# Patient Record
Sex: MALE | Race: WHITE | NOT HISPANIC OR LATINO
[De-identification: names, ages, dates, MRNs, and addresses within clinical notes are randomized per-mention and may not be internally consistent; named-entity substitution may affect disease eponyms.]

---

## 2017-03-22 ENCOUNTER — APPOINTMENT (OUTPATIENT)
Dept: ORTHOPEDIC SURGERY | Facility: CLINIC | Age: 67
End: 2017-03-22

## 2017-03-22 VITALS
WEIGHT: 200 LBS | HEART RATE: 77 BPM | HEIGHT: 68 IN | DIASTOLIC BLOOD PRESSURE: 108 MMHG | BODY MASS INDEX: 30.31 KG/M2 | SYSTOLIC BLOOD PRESSURE: 173 MMHG

## 2017-03-22 DIAGNOSIS — M25.562 PAIN IN LEFT KNEE: ICD-10-CM

## 2017-04-03 ENCOUNTER — APPOINTMENT (OUTPATIENT)
Dept: ORTHOPEDIC SURGERY | Facility: CLINIC | Age: 67
End: 2017-04-03

## 2017-04-03 DIAGNOSIS — S83.282A OTHER TEAR OF LATERAL MENISCUS, CURRENT INJURY, LEFT KNEE, INITIAL ENCOUNTER: ICD-10-CM

## 2017-04-04 PROBLEM — S83.282A ACUTE LATERAL MENISCAL TEAR, LEFT, INITIAL ENCOUNTER: Status: ACTIVE | Noted: 2017-03-22

## 2017-06-06 ENCOUNTER — RX RENEWAL (OUTPATIENT)
Age: 67
End: 2017-06-06

## 2017-06-15 ENCOUNTER — APPOINTMENT (OUTPATIENT)
Dept: ORTHOPEDIC SURGERY | Facility: CLINIC | Age: 67
End: 2017-06-15

## 2017-06-15 DIAGNOSIS — S83.207D UNSPECIFIED TEAR OF UNSPECIFIED MENISCUS, CURRENT INJURY, LEFT KNEE, SUBSEQUENT ENCOUNTER: ICD-10-CM

## 2017-06-19 ENCOUNTER — APPOINTMENT (OUTPATIENT)
Dept: ORTHOPEDIC SURGERY | Facility: CLINIC | Age: 67
End: 2017-06-19

## 2017-06-27 PROBLEM — S83.207D ACUTE MENISCAL TEAR OF LEFT KNEE, SUBSEQUENT ENCOUNTER: Status: ACTIVE | Noted: 2017-03-22

## 2018-01-02 ENCOUNTER — APPOINTMENT (OUTPATIENT)
Dept: ORTHOPEDIC SURGERY | Facility: CLINIC | Age: 68
End: 2018-01-02
Payer: COMMERCIAL

## 2018-01-02 DIAGNOSIS — M65.342 TRIGGER FINGER, LEFT RING FINGER: ICD-10-CM

## 2018-01-02 PROCEDURE — 99214 OFFICE O/P EST MOD 30 MIN: CPT | Mod: 25

## 2018-01-02 PROCEDURE — 20550 NJX 1 TENDON SHEATH/LIGAMENT: CPT | Mod: F5

## 2018-04-02 ENCOUNTER — APPOINTMENT (OUTPATIENT)
Dept: ORTHOPEDIC SURGERY | Facility: CLINIC | Age: 68
End: 2018-04-02
Payer: COMMERCIAL

## 2018-04-02 DIAGNOSIS — M65.311 TRIGGER THUMB, RIGHT THUMB: ICD-10-CM

## 2018-04-02 PROCEDURE — 20550 NJX 1 TENDON SHEATH/LIGAMENT: CPT | Mod: RT

## 2018-04-02 PROCEDURE — 99214 OFFICE O/P EST MOD 30 MIN: CPT | Mod: 25

## 2019-05-22 ENCOUNTER — APPOINTMENT (OUTPATIENT)
Dept: ORTHOPEDIC SURGERY | Facility: CLINIC | Age: 69
End: 2019-05-22
Payer: OTHER MISCELLANEOUS

## 2019-05-22 PROCEDURE — 99214 OFFICE O/P EST MOD 30 MIN: CPT | Mod: 25

## 2019-05-22 PROCEDURE — 20526 THER INJECTION CARP TUNNEL: CPT | Mod: LT

## 2019-07-29 ENCOUNTER — APPOINTMENT (OUTPATIENT)
Dept: ORTHOPEDIC SURGERY | Facility: CLINIC | Age: 69
End: 2019-07-29
Payer: OTHER MISCELLANEOUS

## 2019-07-29 VITALS
DIASTOLIC BLOOD PRESSURE: 79 MMHG | WEIGHT: 200 LBS | BODY MASS INDEX: 30.31 KG/M2 | HEIGHT: 68 IN | HEART RATE: 69 BPM | SYSTOLIC BLOOD PRESSURE: 129 MMHG

## 2019-07-29 PROCEDURE — 99213 OFFICE O/P EST LOW 20 MIN: CPT

## 2020-03-17 ENCOUNTER — APPOINTMENT (OUTPATIENT)
Dept: ORTHOPEDIC SURGERY | Facility: CLINIC | Age: 70
End: 2020-03-17
Payer: OTHER MISCELLANEOUS

## 2020-03-17 VITALS — BODY MASS INDEX: 31.07 KG/M2 | WEIGHT: 205 LBS | HEIGHT: 68 IN | HEART RATE: 66 BPM

## 2020-03-17 PROCEDURE — 99214 OFFICE O/P EST MOD 30 MIN: CPT | Mod: 25

## 2020-03-17 PROCEDURE — 20526 THER INJECTION CARP TUNNEL: CPT | Mod: LT

## 2020-08-24 ENCOUNTER — APPOINTMENT (OUTPATIENT)
Dept: ORTHOPEDIC SURGERY | Facility: CLINIC | Age: 70
End: 2020-08-24
Payer: OTHER MISCELLANEOUS

## 2020-08-24 VITALS
DIASTOLIC BLOOD PRESSURE: 78 MMHG | HEIGHT: 68 IN | TEMPERATURE: 97.4 F | HEART RATE: 78 BPM | WEIGHT: 204 LBS | SYSTOLIC BLOOD PRESSURE: 134 MMHG | BODY MASS INDEX: 30.92 KG/M2

## 2020-08-24 DIAGNOSIS — M47.812 SPONDYLOSIS W/OUT MYELOPATHY OR RADICULOPATHY, CERVICAL REGION: ICD-10-CM

## 2020-08-24 PROCEDURE — 99203 OFFICE O/P NEW LOW 30 MIN: CPT

## 2020-08-24 PROCEDURE — 72040 X-RAY EXAM NECK SPINE 2-3 VW: CPT

## 2020-08-25 PROBLEM — M47.812 CERVICAL SPONDYLOSIS: Status: ACTIVE | Noted: 2020-08-25

## 2020-08-25 NOTE — HISTORY OF PRESENT ILLNESS
[de-identified] : Patient is a 70-year-old male who had a work-related injury approximately 11 days ago.  He was hit in the back of the neck on the left side with some piping.  He denies any radicular complaints.  Symptoms are improving with anti-inflammatories.  No numbness, tingling, weakness, or bowel or bladder dysfunction. No difficulty with balance or fine motor skills. No fevers or chills. No constitutional symptoms or recent unintended weight loss.

## 2020-08-25 NOTE — DISCUSSION/SUMMARY
[de-identified] : He will try a course of physical therapy.  MRI if he fails to continue to improve or worsens.

## 2020-08-25 NOTE — PHYSICAL EXAM
[Ataxic] : not ataxic [de-identified] : AP lateral cervical x-rays does not reveal any obvious fracture or dislocation.  Multilevel spondylosis [de-identified] : Examination of the cervical spine reveals no midline or paraspinal tenderness to palpation. No cervical lymphadenopathy. Decreased range of motion with respect to flexion, extension, rotation, and lateral bending. Negative Spurlings. Negative Lhermitte's. Full range of motion bilateral shoulders without evidence of impingement. No instability of bilateral upper extremities.  Cranial nerves II through XII grossly intact. Intact sensation bilateral upper extremities. 5/5 deltoids biceps triceps wrist extensors wrist flexors finger flexors and hand intrinsics. 1+ biceps triceps and brachioradialis reflexes. Negative Hobbs's. 2+ radial pulse. Negative Tinel's over the cubital and carpal tunnel. No skin lesions on the right and left upper extremities.

## 2020-09-20 ENCOUNTER — TRANSCRIPTION ENCOUNTER (OUTPATIENT)
Age: 70
End: 2020-09-20

## 2020-12-10 ENCOUNTER — APPOINTMENT (OUTPATIENT)
Dept: ORTHOPEDIC SURGERY | Facility: CLINIC | Age: 70
End: 2020-12-10
Payer: OTHER MISCELLANEOUS

## 2020-12-10 PROCEDURE — 99214 OFFICE O/P EST MOD 30 MIN: CPT

## 2020-12-10 PROCEDURE — 99072 ADDL SUPL MATRL&STAF TM PHE: CPT

## 2020-12-15 ENCOUNTER — NON-APPOINTMENT (OUTPATIENT)
Age: 70
End: 2020-12-15

## 2021-01-22 ENCOUNTER — NON-APPOINTMENT (OUTPATIENT)
Age: 71
End: 2021-01-22

## 2021-01-28 ENCOUNTER — FORM ENCOUNTER (OUTPATIENT)
Age: 71
End: 2021-01-28

## 2021-01-28 ENCOUNTER — APPOINTMENT (OUTPATIENT)
Dept: ORTHOPEDIC SURGERY | Facility: CLINIC | Age: 71
End: 2021-01-28
Payer: OTHER MISCELLANEOUS

## 2021-01-28 VITALS
WEIGHT: 202 LBS | HEIGHT: 68 IN | DIASTOLIC BLOOD PRESSURE: 96 MMHG | HEART RATE: 81 BPM | SYSTOLIC BLOOD PRESSURE: 172 MMHG | BODY MASS INDEX: 30.62 KG/M2

## 2021-01-28 VITALS — TEMPERATURE: 97.6 F

## 2021-01-28 PROCEDURE — 99214 OFFICE O/P EST MOD 30 MIN: CPT

## 2021-01-28 PROCEDURE — 99072 ADDL SUPL MATRL&STAF TM PHE: CPT

## 2021-01-28 NOTE — HISTORY OF PRESENT ILLNESS
[de-identified] : Patient returns for follow-up today.  He continues to have symptoms.  He is having more symptoms radiating down his left arm.  He was diagnosed with carpal tunnel.  A recent EMG also indicated he may have some cervical radiculopathy.

## 2021-01-28 NOTE — PHYSICAL EXAM
[Ataxic] : not ataxic [de-identified] : Examination of the cervical spine reveals no midline or paraspinal tenderness to palpation. No cervical lymphadenopathy. Decreased range of motion with respect to flexion, extension, rotation, and lateral bending.  Positive Spurlings. Negative Lhermitte's. Full range of motion bilateral shoulders without evidence of impingement. No instability of bilateral upper extremities.  Cranial nerves II through XII grossly intact. Intact sensation bilateral upper extremities. 5/5 deltoids biceps triceps wrist extensors wrist flexors finger flexors and hand intrinsics. 1+ biceps triceps and brachioradialis reflexes. Negative Hobbs's. 2+ radial pulse.  Positive Tinel's over the left carpal tunnel. No skin lesions on the right and left upper extremities. [de-identified] : AP lateral cervical x-rays does not reveal any obvious fracture or dislocation.  Multilevel spondylosis

## 2021-01-28 NOTE — DISCUSSION/SUMMARY
[de-identified] : Given his radiculopathy it does appear he has some double crush component to his symptoms.  The cervical spine MRI will be obtained.  Follow-up afterwards.

## 2021-02-03 ENCOUNTER — FORM ENCOUNTER (OUTPATIENT)
Age: 71
End: 2021-02-03

## 2021-02-10 ENCOUNTER — APPOINTMENT (OUTPATIENT)
Dept: ORTHOPEDIC SURGERY | Facility: CLINIC | Age: 71
End: 2021-02-10
Payer: OTHER MISCELLANEOUS

## 2021-02-10 VITALS
HEIGHT: 68 IN | DIASTOLIC BLOOD PRESSURE: 94 MMHG | BODY MASS INDEX: 30.62 KG/M2 | WEIGHT: 202 LBS | SYSTOLIC BLOOD PRESSURE: 151 MMHG

## 2021-02-10 VITALS — TEMPERATURE: 97.8 F

## 2021-02-10 DIAGNOSIS — Z78.9 OTHER SPECIFIED HEALTH STATUS: ICD-10-CM

## 2021-02-10 PROCEDURE — 99072 ADDL SUPL MATRL&STAF TM PHE: CPT

## 2021-02-10 PROCEDURE — 99214 OFFICE O/P EST MOD 30 MIN: CPT

## 2021-02-12 PROBLEM — Z78.9 CURRENT NON-SMOKER: Status: ACTIVE | Noted: 2021-02-12

## 2021-03-08 ENCOUNTER — APPOINTMENT (OUTPATIENT)
Dept: ORTHOPEDIC SURGERY | Facility: CLINIC | Age: 71
End: 2021-03-08
Payer: OTHER MISCELLANEOUS

## 2021-03-08 PROCEDURE — 99072 ADDL SUPL MATRL&STAF TM PHE: CPT

## 2021-03-08 PROCEDURE — 99214 OFFICE O/P EST MOD 30 MIN: CPT

## 2021-03-08 NOTE — DISCUSSION/SUMMARY
[de-identified] : We discussed further treatment options.  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  We discussed treatment of her cervical spine.  He would like to undergo some physical therapy.  He is not interested in injections or surgery at this point.  He will undergo a course of therapy and follow-up after sooner with any changes or worsening of his symptoms.

## 2021-03-08 NOTE — PHYSICAL EXAM
[Ataxic] : not ataxic [de-identified] : Examination of the cervical spine reveals no midline or paraspinal tenderness to palpation. No cervical lymphadenopathy. Decreased range of motion with respect to flexion, extension, rotation, and lateral bending.  Positive Spurlings. Negative Lhermitte's. Full range of motion bilateral shoulders without evidence of impingement. No instability of bilateral upper extremities.  Cranial nerves II through XII grossly intact. Intact sensation bilateral upper extremities. 5/5 deltoids biceps triceps wrist extensors wrist flexors finger flexors and hand intrinsics. 1+ biceps triceps and brachioradialis reflexes. Negative Hobbs's. 2+ radial pulse.  Positive Tinel's over the left carpal tunnel. No skin lesions on the right and left upper extremities. [de-identified] : AP lateral cervical x-rays does not reveal any obvious fracture or dislocation.  Multilevel spondylosis\par \par Cervical MRI does demonstrate left-sided foraminal stenosis most pronounced at C6-7\par \par EMG does reveal evidence of cervical radiculopathy and carpal tunnel syndrome

## 2021-03-08 NOTE — HISTORY OF PRESENT ILLNESS
[de-identified] : Mr. Woodson returns to the office for a follow-up visit today.  He is here to review his cervical MRI.  He continues to have neck and left shoulder pain.  His left hand numbness is more consistent.

## 2021-04-30 ENCOUNTER — OUTPATIENT (OUTPATIENT)
Dept: OUTPATIENT SERVICES | Facility: HOSPITAL | Age: 71
LOS: 1 days | End: 2021-04-30
Payer: COMMERCIAL

## 2021-04-30 VITALS
HEIGHT: 68 IN | OXYGEN SATURATION: 98 % | HEART RATE: 70 BPM | SYSTOLIC BLOOD PRESSURE: 152 MMHG | WEIGHT: 205.91 LBS | TEMPERATURE: 98 F | RESPIRATION RATE: 18 BRPM | DIASTOLIC BLOOD PRESSURE: 80 MMHG

## 2021-04-30 DIAGNOSIS — Z01.818 ENCOUNTER FOR OTHER PREPROCEDURAL EXAMINATION: ICD-10-CM

## 2021-04-30 DIAGNOSIS — Z90.89 ACQUIRED ABSENCE OF OTHER ORGANS: Chronic | ICD-10-CM

## 2021-04-30 DIAGNOSIS — G56.02 CARPAL TUNNEL SYNDROME, LEFT UPPER LIMB: ICD-10-CM

## 2021-04-30 DIAGNOSIS — Z98.890 OTHER SPECIFIED POSTPROCEDURAL STATES: Chronic | ICD-10-CM

## 2021-04-30 LAB
HCT VFR BLD CALC: 43.6 % — SIGNIFICANT CHANGE UP (ref 39–50)
HGB BLD-MCNC: 14.9 G/DL — SIGNIFICANT CHANGE UP (ref 13–17)
MCHC RBC-ENTMCNC: 31.1 PG — SIGNIFICANT CHANGE UP (ref 27–34)
MCHC RBC-ENTMCNC: 34.2 GM/DL — SIGNIFICANT CHANGE UP (ref 32–36)
MCV RBC AUTO: 91 FL — SIGNIFICANT CHANGE UP (ref 80–100)
NRBC # BLD: 0 /100 WBCS — SIGNIFICANT CHANGE UP (ref 0–0)
PLATELET # BLD AUTO: 242 K/UL — SIGNIFICANT CHANGE UP (ref 150–400)
RBC # BLD: 4.79 M/UL — SIGNIFICANT CHANGE UP (ref 4.2–5.8)
RBC # FLD: 12.7 % — SIGNIFICANT CHANGE UP (ref 10.3–14.5)
WBC # BLD: 7.2 K/UL — SIGNIFICANT CHANGE UP (ref 3.8–10.5)
WBC # FLD AUTO: 7.2 K/UL — SIGNIFICANT CHANGE UP (ref 3.8–10.5)

## 2021-04-30 PROCEDURE — 85027 COMPLETE CBC AUTOMATED: CPT

## 2021-04-30 PROCEDURE — G0463: CPT

## 2021-04-30 RX ORDER — SODIUM CHLORIDE 9 MG/ML
3 INJECTION INTRAMUSCULAR; INTRAVENOUS; SUBCUTANEOUS EVERY 8 HOURS
Refills: 0 | Status: DISCONTINUED | OUTPATIENT
Start: 2021-05-14 | End: 2021-05-28

## 2021-04-30 RX ORDER — LIDOCAINE HCL 20 MG/ML
0.2 VIAL (ML) INJECTION ONCE
Refills: 0 | Status: DISCONTINUED | OUTPATIENT
Start: 2021-05-14 | End: 2021-05-28

## 2021-04-30 NOTE — H&P PST ADULT - NSICDXPROBLEM_GEN_ALL_CORE_FT
PROBLEM DIAGNOSES  Problem: Carpal tunnel syndrome, left  Assessment and Plan: left wrist carpal tunnel release

## 2021-04-30 NOTE — H&P PST ADULT - HISTORY OF PRESENT ILLNESS
This is a 72 y/o male c/o left wrist pain/numbness, EMG revealed + carpal tunnel syndrome, he presents today for left wrist carpal tunnel release 5/14/21  [facility] pt denies fever, cough, SOB, change in taste/smell

## 2021-04-30 NOTE — H&P PST ADULT - NSANTHOSAYNRD_GEN_A_CORE
No. ERASMO screening performed.  STOP BANG Legend: 0-2 = LOW Risk; 3-4 = INTERMEDIATE Risk; 5-8 = HIGH Risk

## 2021-04-30 NOTE — H&P PST ADULT - NSICDXPASTMEDICALHX_GEN_ALL_CORE_FT
PAST MEDICAL HISTORY:  Neck pain, bilateral have physical therapy  to relieve neck pain    Seasonal allergies

## 2021-05-10 PROBLEM — M54.2 CERVICALGIA: Chronic | Status: ACTIVE | Noted: 2021-04-30

## 2021-05-10 PROBLEM — J30.2 OTHER SEASONAL ALLERGIC RHINITIS: Chronic | Status: ACTIVE | Noted: 2021-04-30

## 2021-05-11 ENCOUNTER — OUTPATIENT (OUTPATIENT)
Dept: OUTPATIENT SERVICES | Facility: HOSPITAL | Age: 71
LOS: 1 days | End: 2021-05-11
Payer: COMMERCIAL

## 2021-05-11 DIAGNOSIS — Z11.52 ENCOUNTER FOR SCREENING FOR COVID-19: ICD-10-CM

## 2021-05-11 DIAGNOSIS — Z90.89 ACQUIRED ABSENCE OF OTHER ORGANS: Chronic | ICD-10-CM

## 2021-05-11 DIAGNOSIS — Z98.890 OTHER SPECIFIED POSTPROCEDURAL STATES: Chronic | ICD-10-CM

## 2021-05-11 LAB — SARS-COV-2 RNA SPEC QL NAA+PROBE: SIGNIFICANT CHANGE UP

## 2021-05-11 PROCEDURE — C9803: CPT

## 2021-05-11 PROCEDURE — U0003: CPT

## 2021-05-11 PROCEDURE — U0005: CPT

## 2021-05-12 ENCOUNTER — NON-APPOINTMENT (OUTPATIENT)
Age: 71
End: 2021-05-12

## 2021-05-13 ENCOUNTER — TRANSCRIPTION ENCOUNTER (OUTPATIENT)
Age: 71
End: 2021-05-13

## 2021-05-14 ENCOUNTER — APPOINTMENT (OUTPATIENT)
Dept: ORTHOPEDIC SURGERY | Facility: HOSPITAL | Age: 71
End: 2021-05-14

## 2021-05-14 ENCOUNTER — OUTPATIENT (OUTPATIENT)
Dept: OUTPATIENT SERVICES | Facility: HOSPITAL | Age: 71
LOS: 1 days | End: 2021-05-14
Payer: COMMERCIAL

## 2021-05-14 VITALS
DIASTOLIC BLOOD PRESSURE: 81 MMHG | OXYGEN SATURATION: 100 % | TEMPERATURE: 98 F | RESPIRATION RATE: 14 BRPM | SYSTOLIC BLOOD PRESSURE: 140 MMHG | HEART RATE: 57 BPM

## 2021-05-14 VITALS
OXYGEN SATURATION: 99 % | TEMPERATURE: 98 F | WEIGHT: 205.91 LBS | DIASTOLIC BLOOD PRESSURE: 73 MMHG | HEIGHT: 68 IN | HEART RATE: 57 BPM | SYSTOLIC BLOOD PRESSURE: 149 MMHG | RESPIRATION RATE: 16 BRPM

## 2021-05-14 DIAGNOSIS — Z90.89 ACQUIRED ABSENCE OF OTHER ORGANS: Chronic | ICD-10-CM

## 2021-05-14 DIAGNOSIS — Z01.818 ENCOUNTER FOR OTHER PREPROCEDURAL EXAMINATION: ICD-10-CM

## 2021-05-14 DIAGNOSIS — Z98.890 OTHER SPECIFIED POSTPROCEDURAL STATES: Chronic | ICD-10-CM

## 2021-05-14 DIAGNOSIS — G56.02 CARPAL TUNNEL SYNDROME, LEFT UPPER LIMB: ICD-10-CM

## 2021-05-14 PROCEDURE — 64721 CARPAL TUNNEL SURGERY: CPT | Mod: LT

## 2021-05-14 RX ORDER — SODIUM CHLORIDE 9 MG/ML
1000 INJECTION, SOLUTION INTRAVENOUS
Refills: 0 | Status: DISCONTINUED | OUTPATIENT
Start: 2021-05-14 | End: 2021-05-28

## 2021-05-14 RX ORDER — CHLORHEXIDINE GLUCONATE 213 G/1000ML
1 SOLUTION TOPICAL ONCE
Refills: 0 | Status: COMPLETED | OUTPATIENT
Start: 2021-05-14 | End: 2021-05-14

## 2021-05-14 RX ADMIN — CHLORHEXIDINE GLUCONATE 1 APPLICATION(S): 213 SOLUTION TOPICAL at 05:34

## 2021-05-14 NOTE — ASU DISCHARGE PLAN (ADULT/PEDIATRIC) - ASU DC SPECIAL INSTRUCTIONSFT
keep dressing clean and dry  elevation, finger range of motion encouraged  no heavy lifting  koban bandage supplied for dressing change  follow up with Dr. Mix in 10 days, call for appointment

## 2021-05-19 ENCOUNTER — APPOINTMENT (OUTPATIENT)
Dept: ORTHOPEDIC SURGERY | Facility: CLINIC | Age: 71
End: 2021-05-19
Payer: OTHER MISCELLANEOUS

## 2021-05-19 PROCEDURE — 99214 OFFICE O/P EST MOD 30 MIN: CPT

## 2021-05-19 PROCEDURE — 99072 ADDL SUPL MATRL&STAF TM PHE: CPT

## 2021-05-19 NOTE — DISCUSSION/SUMMARY
[de-identified] : We again discussed further treatment options both nonsurgical and surgical.  He is recovering from carpal tunnel surgery.  He still has some cervical radiculopathy.  We discussed therapy injections and surgery.  At this time he wishes to continue with nonsurgical management.  He will follow up with me in 6 weeks time or sooner with any changes or worsening of his symptoms.

## 2021-05-19 NOTE — PHYSICAL EXAM
[Ataxic] : not ataxic [de-identified] : Examination of the cervical spine reveals no midline or paraspinal tenderness to palpation. No cervical lymphadenopathy. Decreased range of motion with respect to flexion, extension, rotation, and lateral bending.  Positive Spurlings. Negative Lhermitte's. Full range of motion bilateral shoulders without evidence of impingement. No instability of bilateral upper extremities.  Cranial nerves II through XII grossly intact. Intact sensation bilateral upper extremities. 5/5 deltoids biceps triceps wrist extensors wrist flexors finger flexors and hand intrinsics.  Examination of his left hand today is limited by his postsurgical bandage 1+ biceps triceps and brachioradialis reflexes. Negative Hobbs's. 2+ radial pulse. . No skin lesions on the right and left upper extremities. [de-identified] : AP lateral cervical x-rays does not reveal any obvious fracture or dislocation.  Multilevel spondylosis\par \par Cervical MRI does demonstrate left-sided foraminal stenosis most pronounced at C6-7\par \par EMG does reveal evidence of cervical radiculopathy and carpal tunnel syndrome

## 2021-05-19 NOTE — HISTORY OF PRESENT ILLNESS
[de-identified] : Mr. Woodson returns to the office for a follow-up visit today. He only had 4 PT sessions since last visit and feels that pain has gotten worse since last visit  He continues to have neck and left shoulder pain. He had CST sx this past Friday by Dr. Mix.   His left hand numbness is more consistent.

## 2021-05-21 ENCOUNTER — APPOINTMENT (OUTPATIENT)
Dept: ORTHOPEDIC SURGERY | Facility: CLINIC | Age: 71
End: 2021-05-21
Payer: OTHER MISCELLANEOUS

## 2021-05-21 PROCEDURE — 99024 POSTOP FOLLOW-UP VISIT: CPT

## 2021-05-21 RX ORDER — TOBRAMYCIN AND DEXAMETHASONE 3; 1 MG/ML; MG/ML
0.3-0.1 SUSPENSION/ DROPS OPHTHALMIC
Qty: 5 | Refills: 0 | Status: DISCONTINUED | COMMUNITY
Start: 2021-02-02 | End: 2021-05-21

## 2021-05-21 NOTE — HISTORY OF PRESENT ILLNESS
[de-identified] : Left carpal tunnel release 7 days ago. [de-identified] : The patient underwent left carpal tunnel release May 14, 2021.  Patient returns for follow-up.\par Patient reports that the pain and numbness that was worse at night is much better.  "The whole hand is not getting numb now."  Patient has ongoing numbness primarily in index and long fingers. [de-identified] : Left hand\par Carpal tunnel incision is healing well.  2 of 5 sutures removed.  3 sutures left in place.\par Full active flexion extension of fingers\par Minimal swelling around the incision and minimal ecchymosis.\par Sensation normal in ring and little fingers.\par Sensation much improved in thumb but not normal.\par Ongoing numbness in index and long fingers.\par No additional pertinent positive contributory findings. [de-identified] : 1. Patient has considerable improvement after left carpal tunnel release.  Patient still has ongoing numbness in the index and long fingers in particular and slightly in the thumb.  The night time exacerbations have improved considerably almost gone.  The ring and little fingers have normal sensation.  Partial sutures removed today.\par 2.  The patient has ongoing symptoms on the right.  Symptoms are due to both carpal tunnel syndrome and cervical radiculopathy.  The patient is under the care of Isacc Trotter MD for radiculopathy. [de-identified] : 1.  Patient should return approximately June 1, 2021, for removal of remaining sutures.\par 2.  Wound care including washing with Hibiclens, cleansing with alcohol, and applying triple antibiotic if redness in the skin appears all discussed.  Application and removal of dressings explained and discussed.\par 3.  Patient must be cautious with the left hand to allow for wound healing and to avoid dehiscence.  No use with the left hand over 2 to 3 pounds.\par 4.  Patient acknowledges above agrees to be cautious with use of left hand.\par 5.  Patient to follow-up with Dr. Trotter for radiculopathy.\par 6.  Patient may require right carpal tunnel release, and this was discussed today and will be discussed at the next visit.\par  A lengthy and detailed discussion was held with the patient regarding analysis, treatment, and recommendations. All questions have been answered. At the conclusion the patient expressed acceptance and understanding.

## 2021-06-01 ENCOUNTER — APPOINTMENT (OUTPATIENT)
Dept: ORTHOPEDIC SURGERY | Facility: CLINIC | Age: 71
End: 2021-06-01
Payer: OTHER MISCELLANEOUS

## 2021-06-01 PROCEDURE — 99024 POSTOP FOLLOW-UP VISIT: CPT

## 2021-06-01 RX ORDER — MOXIFLOXACIN OPHTHALMIC 5 MG/ML
0.5 SOLUTION/ DROPS OPHTHALMIC
Qty: 3 | Refills: 0 | Status: ACTIVE | COMMUNITY
Start: 2021-04-26

## 2021-06-28 ENCOUNTER — APPOINTMENT (OUTPATIENT)
Dept: ORTHOPEDIC SURGERY | Facility: CLINIC | Age: 71
End: 2021-06-28
Payer: OTHER MISCELLANEOUS

## 2021-06-28 PROCEDURE — 99072 ADDL SUPL MATRL&STAF TM PHE: CPT

## 2021-06-28 PROCEDURE — 99214 OFFICE O/P EST MOD 30 MIN: CPT

## 2021-06-29 ENCOUNTER — FORM ENCOUNTER (OUTPATIENT)
Age: 71
End: 2021-06-29

## 2021-06-29 NOTE — DISCUSSION/SUMMARY
[de-identified] : We discussed further treatment options.  He would like to try some PT.  F/u after

## 2021-06-29 NOTE — HISTORY OF PRESENT ILLNESS
[de-identified] : Mr. Woodson returns to the office for a follow-up visit today. He only had 4 PT sessions since last visit and feels that pain has gotten worse since last visit  He continues to have neck and left shoulder pain. He had CST sx this past Friday by Dr. Mix.   His left hand numbness is more consistent.

## 2021-06-29 NOTE — PHYSICAL EXAM
[Ataxic] : not ataxic [de-identified] : Examination of the cervical spine reveals no midline or paraspinal tenderness to palpation. No cervical lymphadenopathy. Decreased range of motion with respect to flexion, extension, rotation, and lateral bending.  Positive Spurlings. Negative Lhermitte's. Full range of motion bilateral shoulders without evidence of impingement. No instability of bilateral upper extremities.  Cranial nerves II through XII grossly intact. Intact sensation bilateral upper extremities. 5/5 deltoids biceps triceps wrist extensors wrist flexors finger flexors and hand intrinsics.  1+ biceps triceps and brachioradialis reflexes. Negative Hobbs's. 2+ radial pulse. . No skin lesions on the right and left upper extremities. [de-identified] : AP lateral cervical x-rays does not reveal any obvious fracture or dislocation.  Multilevel spondylosis\par \par Cervical MRI does demonstrate left-sided foraminal stenosis most pronounced at C6-7\par \par EMG does reveal evidence of cervical radiculopathy and carpal tunnel syndrome

## 2021-07-05 ENCOUNTER — FORM ENCOUNTER (OUTPATIENT)
Age: 71
End: 2021-07-05

## 2021-07-13 ENCOUNTER — APPOINTMENT (OUTPATIENT)
Dept: ORTHOPEDIC SURGERY | Facility: CLINIC | Age: 71
End: 2021-07-13
Payer: COMMERCIAL

## 2021-07-13 PROCEDURE — 99213 OFFICE O/P EST LOW 20 MIN: CPT | Mod: 24

## 2021-07-13 PROCEDURE — 99072 ADDL SUPL MATRL&STAF TM PHE: CPT

## 2021-08-23 ENCOUNTER — APPOINTMENT (OUTPATIENT)
Dept: ORTHOPEDIC SURGERY | Facility: CLINIC | Age: 71
End: 2021-08-23
Payer: OTHER MISCELLANEOUS

## 2021-08-23 VITALS — BODY MASS INDEX: 30.31 KG/M2 | HEIGHT: 68 IN | WEIGHT: 200 LBS

## 2021-08-23 PROCEDURE — 20550 NJX 1 TENDON SHEATH/LIGAMENT: CPT | Mod: FA

## 2021-08-23 PROCEDURE — 99214 OFFICE O/P EST MOD 30 MIN: CPT | Mod: 25

## 2021-08-23 PROCEDURE — 99072 ADDL SUPL MATRL&STAF TM PHE: CPT

## 2021-08-30 ENCOUNTER — APPOINTMENT (OUTPATIENT)
Dept: ORTHOPEDIC SURGERY | Facility: CLINIC | Age: 71
End: 2021-08-30
Payer: OTHER MISCELLANEOUS

## 2021-08-30 PROCEDURE — 99072 ADDL SUPL MATRL&STAF TM PHE: CPT

## 2021-08-30 PROCEDURE — 99214 OFFICE O/P EST MOD 30 MIN: CPT

## 2021-08-30 NOTE — PHYSICAL EXAM
[Ataxic] : not ataxic [de-identified] : Examination of the cervical spine reveals no midline or paraspinal tenderness to palpation. No cervical lymphadenopathy. Decreased range of motion with respect to flexion, extension, rotation, and lateral bending.  Positive Spurlings. Negative Lhermitte's. Full range of motion bilateral shoulders without evidence of impingement. No instability of bilateral upper extremities.  Cranial nerves II through XII grossly intact. Intact sensation bilateral upper extremities. 5/5 deltoids biceps triceps wrist extensors wrist flexors finger flexors and hand intrinsics.  1+ biceps triceps and brachioradialis reflexes. Negative Hobbs's. 2+ radial pulse. . No skin lesions on the right and left upper extremities. [de-identified] : AP lateral cervical x-rays does not reveal any obvious fracture or dislocation.  Multilevel spondylosis\par \par Cervical MRI does demonstrate left-sided foraminal stenosis most pronounced at C6-7\par \par EMG does reveal evidence of cervical radiculopathy and carpal tunnel syndrome

## 2021-08-30 NOTE — HISTORY OF PRESENT ILLNESS
[de-identified] : Mr. Woodson returns to the office for a follow-up visit today. He has been doing physical therapy.  He feels it has helped his neck pain and range of motion by 20 %.  His left arm feels the same.

## 2021-08-30 NOTE — DISCUSSION/SUMMARY
[de-identified] : We discussed further treatment options both nonsurgical and surgical.  This point he wishes to continue with nonsurgical treatment.  He will continue with therapy.  Follow-up afterwards.

## 2021-09-08 ENCOUNTER — FORM ENCOUNTER (OUTPATIENT)
Age: 71
End: 2021-09-08

## 2021-09-13 ENCOUNTER — FORM ENCOUNTER (OUTPATIENT)
Age: 71
End: 2021-09-13

## 2021-09-20 ENCOUNTER — APPOINTMENT (OUTPATIENT)
Dept: ORTHOPEDIC SURGERY | Facility: CLINIC | Age: 71
End: 2021-09-20

## 2021-11-04 ENCOUNTER — APPOINTMENT (OUTPATIENT)
Dept: ORTHOPEDIC SURGERY | Facility: CLINIC | Age: 71
End: 2021-11-04

## 2021-11-04 NOTE — HISTORY OF PRESENT ILLNESS
[FreeTextEntry1] : 70y/o RHD male presents for follow up of B/L CTS. \par \par 1) Left: Patient had Left carpal tunnel release May 14, 2021. The patient reports some improvement of numbness and tingling after surgery. However, he reports he is still experiencing some numbness and tingling in the left hand, and intermittent pain that radiates from his neck down the left arm into the left hand. \par \par 2) Right carpal tunnel syndrome. \par Patient reports ongoing intermittent numbness, tingling, and pain in the Right hand for several years.\par He reports the symptoms are worse when holding a phone, driving a car, or when holding an object for a long period of time. Not usually at night. Right carpal tunnel release was previously discussed. He states he would like to schedule surgery after his left hand improves. \par \par The patient had EMG 01/06/21. EMG shows bilateral carpal tunnel syndrome primarily. There are also findings of cervical radiculopathy, bilateral C 5-6 radiculopathy. there are some minor findings of ulnar and radial nerve changes but the patient does not have corresponding subjective symptoms.\par \par Hx: s/p Left carpal tunnel release May 14, 2021. \par Pt has ongoing neck and upper back pain radiating to upper back and towards left shoulder and prox upper arm. Patient has been doing physical therapy for cervical spine and left arm. [FreeTextEntry2] :

## 2021-11-17 ENCOUNTER — APPOINTMENT (OUTPATIENT)
Dept: ORTHOPEDIC SURGERY | Facility: CLINIC | Age: 71
End: 2021-11-17
Payer: OTHER MISCELLANEOUS

## 2021-11-17 VITALS
SYSTOLIC BLOOD PRESSURE: 146 MMHG | DIASTOLIC BLOOD PRESSURE: 81 MMHG | HEIGHT: 68 IN | WEIGHT: 200 LBS | BODY MASS INDEX: 30.31 KG/M2 | HEART RATE: 65 BPM

## 2021-11-17 DIAGNOSIS — M65.332 TRIGGER FINGER, LEFT MIDDLE FINGER: ICD-10-CM

## 2021-11-17 PROCEDURE — 20550 NJX 1 TENDON SHEATH/LIGAMENT: CPT | Mod: FA

## 2021-11-17 PROCEDURE — 99072 ADDL SUPL MATRL&STAF TM PHE: CPT

## 2021-11-17 PROCEDURE — 99215 OFFICE O/P EST HI 40 MIN: CPT | Mod: 25

## 2021-11-17 NOTE — ASSESSMENT
[FreeTextEntry1] : Patient is [employer] .  The patient has worked with his hands for many years as an .  As a result the patient has developed bilateral carpal tunnel syndrome.  Left carpal tunnel release was performed with moderate improvement only. \par \par Currently, the patient has painful and continuous numbness and tingling in the right with frequent exacerbations.  This is due to longstanding right carpal tunnel syndrome work related.  Because the patient has patient has markedly symptomatic carpal tunnel syndrome on the right with positive electrodiagnostic testing, and because the patient is refractory to conservative treatment for carpal tunnel syndrome, right carpal tunnel release (78163) is medically indicated necessary.\par Approval and authorization for right carpal tunnel release are requested requested.\par Approval and authorization for hand therapy postoperatively if necessary are also requested requested.

## 2021-11-17 NOTE — PHYSICAL EXAM
[de-identified] : Right-hand:\par Thumb A1 pulley slightly tender.\par Nodularity associated with FPL.\par No pain, no provocable or active triggering\par There is no other A1 pulley tenderness or triggering in any finger, right hand.\par No pertinent MP, PIP, or DIP joint contributory findings, except some Heberden's nodes; none are clinically painful.\par \par Left hand:\par severe Tenderness left thumb A1 pulley; restricted movement b/o pain and guarding\par Following cortisone injection patient was able to flex and extend the thumb and triggering was evident.\par Left long finger A1 pulley has tenderness with provocable triggering.  Pain is mild.\par No  other A1 pulley tenderness and no triggering in any finger.\par No pertinent MP, PIP, or DIP joint contributory findings, except some Heberden's nodes; none are clinically painful.\par \par Neurologic:\par Left hand: numbness/tingling at rest thumb,  index, long, ring RDN. Normal other fingers.\par Slightly altered subjective sensation on the dorsum of the left hand radially.\par Right: numbness/tingling at rest thumb, index, long ring RDN; Normal UDN4 and little\par Phalen's test with median nerve compression:  \par Negative left\par Mildly positive right \par Radial nerve motor and sensory and ulnar nerve motor and sensory are intact, bilaterally.\par \par Skin: No cyanosis, clubbing, edema or rashes.\par Vascular: Radial pulses intact.\par Lymphatic: No streaking or epitrochlear adenopathy.\par The patient is awake, alert, and oriented. Affect appropriate. Cooperative.

## 2021-11-17 NOTE — PATIENT INSTRUCTIONS
[FreeTextEntry1] : HAND SURGERY PATIENTS\par Instructions: Trigger finger, CTR, de Quervain's, etc. \par \par 1. Maintain hand elevation for 24 to 48 hours. Elevation is one of the best ways to control pain. Swelling usually peaks during this period. After 48 hours, you do not need to maintain elevation if there is no "throbbing" when your hand is lowered. NOTE: Your hand does not have to be elevated continuously. \par \par 2. Bathing: Keep your dressing dry. You may wash exposed fingers with a washcloth. You may shower or bathe with a plastic bag protecting the dressing/cast. Once you've changed the dressing, you may bathe with a disposable glove or bag over the wound.\par \par 3. Dressing: Ask if you may change your dressing two days after surgery. If changing the dressing is allowed, apply 3" x 3" or 2" x 2" gauze pad secured with 2 inch generic Coban or 2" Ace bandage. Change bandage as needed. May remove bandage and may use hand for Activities of Daily Living (ADL) with no dressing, if there is no bleeding, when you are inside the house. Once you change the dressing, you must reapply a new clean dressing at least once every day.\par \par 4. Exercise: It is important to move your fingers, shoulder, and elbow to prevent stiffness. Fully opening and closing your fingers several times per day to maintain full range of motion is essential. When you can readily open and close your fingers fully without pain, you may reduce the frequency of exercising. Nonetheless, it is important to exercise 3 to 4 times each day, even when movement becomes easy and painless. You may perform simple activities under 2 to 3 pounds, e.g., holding a phone, writing, simple keyboarding, using eating utensils. \par \par 5. Pain: Numbness from the nerve block (local anesthesia) usually lasts 4-8 hours, but sometimes lasts more than 24 hours. Once pain starts, take pain medicine as prescribed. Remember, elevation is one of the best ways to control pain. Pain is normal during and following exercise periods. If necessary, take the prescribed medicine. Ask if you can substitute Tylenol, Advil, or Aleve. Note: You must not take more than 4000mg of Tylenol in 24 hours.\par \par 6. Bleeding: Blood staining on your dressing is very common, as is the appearance of "black and blue on exposed fingers or arm. Black and blue discoloration is expected in and around the surgical area.\par \par 7. Swelling: Some finger swelling usually occurs. Exercises and elevation will help control swelling. Ask if you may loosen the dressing. It is important to verify that you may do this.\par \par 8. Infection: Post operative infections are rare. If you get PROGRESSIVE redness, swelling, and pain for more than 24 hour that does not respond to elevation and rest, or if you start to run a fever, call the office: [phone].\par \par 9. Call to schedule a follow up appointment, even if you read this prior to surgery. Arrange follow up approximately one week post operatively, or at the time recommended by your surgeon. \par \par Thank you.\par Garry Mix MD\par \par \par \par

## 2021-11-17 NOTE — PROCEDURE
[] : The injection was done in 2 phases with the first phase being subcutaneous injection of lidocaine 1.5 cc subcutaneously as anesthetic. When adequate level of anesthesia was achieved, the Celestone injection was undertaken. [Thumb] : thumb

## 2021-11-17 NOTE — HISTORY OF PRESENT ILLNESS
[FreeTextEntry1] : 70y/o RHD male presents for follow up of B/L CTS. \par \par 1) Left: Patient had Left carpal tunnel release May 14, 2021. The patient reports some improvement of numbness and tingling after surgery. However, he reports he is still experiencing some numbness and tingling in the left hand, and intermittent pain that radiates from his neck down the left arm into the left hand.  Patient had electrodiagnostic study January 2021 which showed cervical radiculopathy in addition to carpal tunnel syndrome bilaterally..  It is quite likely that the ongoing paresthesias in the left arm are related to the cervical radiculopathy.\par \par 2) Right carpal tunnel syndrome. \par Patient reports ongoing intermittent numbness, tingling, and pain in the Right hand for several years.\par He reports the symptoms are worse when holding a phone, driving a car, or when holding an object for a long period of time. Not usually at night. Right carpal tunnel release was previously discussed. He states he would like to schedule surgery for left carpal tunnel syndrome at this time.\par \par The patient had EMG 01/06/21. EMG shows bilateral carpal tunnel syndrome primarily. There are also findings of cervical radiculopathy, bilateral C 5-6 radiculopathy . there are some minor findings of ulnar and radial nerve changes but the patient does not have corresponding subjective symptoms.\par \par 3) left thumb trigger finger: Patient was relieved after the injection in August.  Pain started to recur 3 to 4 weeks ago.  Triggering started 2 days ago.  Pain in the left thumb is severe.  Patient wishes to have right carpal tunnel release.  Therefore left thumb will be treated with cortisone injection today and if recurrence develops then an effort will be made to perform left thumb trigger release in the future.\par \par Hx: s/p Left carpal tunnel release May 14, 2021. \par Pt has ongoing neck and upper back pain radiating to upper back and towards left shoulder and prox upper arm. Patient has been doing physical therapy for cervical spine and left arm.\par  [Has the patient missed work because of the injury/illness?] : The patient has missed work because of the injury/illness. [No] : The patient is currently not working.

## 2021-11-29 ENCOUNTER — APPOINTMENT (OUTPATIENT)
Dept: ORTHOPEDIC SURGERY | Facility: CLINIC | Age: 71
End: 2021-11-29
Payer: OTHER MISCELLANEOUS

## 2021-11-29 ENCOUNTER — FORM ENCOUNTER (OUTPATIENT)
Age: 71
End: 2021-11-29

## 2021-11-29 PROCEDURE — 99214 OFFICE O/P EST MOD 30 MIN: CPT

## 2021-11-29 PROCEDURE — 99072 ADDL SUPL MATRL&STAF TM PHE: CPT

## 2021-11-29 NOTE — PHYSICAL EXAM
[Ataxic] : not ataxic [de-identified] : Examination of the cervical spine reveals no midline or paraspinal tenderness to palpation. No cervical lymphadenopathy. Decreased range of motion with respect to flexion, extension, rotation, and lateral bending.  Positive Spurlings. Negative Lhermitte's. Full range of motion bilateral shoulders without evidence of impingement. No instability of bilateral upper extremities.  Cranial nerves II through XII grossly intact. Intact sensation bilateral upper extremities. 5/5 deltoids biceps triceps wrist extensors wrist flexors finger flexors and hand intrinsics.  1+ biceps triceps and brachioradialis reflexes. Negative Hobbs's. 2+ radial pulse. . No skin lesions on the right and left upper extremities. [de-identified] : AP lateral cervical x-rays does not reveal any obvious fracture or dislocation.  Multilevel spondylosis\par \par Cervical MRI does demonstrate left-sided foraminal stenosis most pronounced at C6-7\par \par EMG does reveal evidence of cervical radiculopathy and carpal tunnel syndrome

## 2021-11-29 NOTE — DISCUSSION/SUMMARY
[de-identified] : We discussed further treatment options.  Given his worsening right-sided symptoms I recommended an updated MRI.  He will continue with some physical therapy in the interim.  Follow-up after the MRI.

## 2021-12-12 ENCOUNTER — FORM ENCOUNTER (OUTPATIENT)
Age: 71
End: 2021-12-12

## 2022-01-19 ENCOUNTER — APPOINTMENT (OUTPATIENT)
Dept: ORTHOPEDIC SURGERY | Facility: CLINIC | Age: 72
End: 2022-01-19
Payer: OTHER MISCELLANEOUS

## 2022-01-19 VITALS — HEIGHT: 68 IN | BODY MASS INDEX: 30.31 KG/M2 | WEIGHT: 200 LBS

## 2022-01-19 PROCEDURE — 99072 ADDL SUPL MATRL&STAF TM PHE: CPT

## 2022-01-19 PROCEDURE — 99214 OFFICE O/P EST MOD 30 MIN: CPT

## 2022-01-19 NOTE — PHYSICAL EXAM
[Ataxic] : not ataxic [de-identified] : Examination of the cervical spine reveals no midline or paraspinal tenderness to palpation. No cervical lymphadenopathy. Decreased range of motion with respect to flexion, extension, rotation, and lateral bending.  Positive Spurlings. Negative Lhermitte's. Full range of motion bilateral shoulders without evidence of impingement. No instability of bilateral upper extremities.  Cranial nerves II through XII grossly intact. Intact sensation bilateral upper extremities. 5/5 deltoids biceps triceps wrist extensors wrist flexors finger flexors and hand intrinsics.  1+ biceps triceps and brachioradialis reflexes. Negative Hobbs's. 2+ radial pulse. . No skin lesions on the right and left upper extremities. [de-identified] : AP lateral cervical x-rays does not reveal any obvious fracture or dislocation.  Multilevel spondylosis\par \par Updated cervical MRI does appear to demonstrate a right-sided C3-4 disc herniation in addition to his prior C6-7 herniation\par \par EMG does reveal evidence of cervical radiculopathy and carpal tunnel syndrome

## 2022-01-19 NOTE — DISCUSSION/SUMMARY
[de-identified] : We discussed further treatment options both nonsurgical and surgical.  At this point he wished to continue with nonsurgical treatment.  We discussed epidural injections but he wished to engage in additional physical therapy.  Updated prescription was given.  I recommend he follow-up afterwards with his images for further review or sooner with any changes or worsening of his symptoms.

## 2022-01-19 NOTE — HISTORY OF PRESENT ILLNESS
[de-identified] : Mr. Woodson returns to the office for a follow-up visit today.  He is here to review his MRI.  He continues to have more right-sided neck pain as well as left arm pain.

## 2022-01-24 ENCOUNTER — APPOINTMENT (OUTPATIENT)
Dept: ORTHOPEDIC SURGERY | Facility: CLINIC | Age: 72
End: 2022-01-24
Payer: OTHER MISCELLANEOUS

## 2022-01-24 PROCEDURE — 99213 OFFICE O/P EST LOW 20 MIN: CPT

## 2022-01-24 PROCEDURE — 99072 ADDL SUPL MATRL&STAF TM PHE: CPT

## 2022-01-31 ENCOUNTER — FORM ENCOUNTER (OUTPATIENT)
Age: 72
End: 2022-01-31

## 2022-02-02 NOTE — HISTORY OF PRESENT ILLNESS
[de-identified] : Mr. Woodson returns to the office for a follow-up visit today.  He is still doing physical therapy.   He feels about 20% better but still has neck, left trap, and left arm pain.  He is also now getting right-sided symptoms.  He feels his cervical ROM is limited. 
No indicators present

## 2022-02-15 ENCOUNTER — FORM ENCOUNTER (OUTPATIENT)
Age: 72
End: 2022-02-15

## 2022-02-15 ENCOUNTER — APPOINTMENT (OUTPATIENT)
Dept: ORTHOPEDIC SURGERY | Facility: CLINIC | Age: 72
End: 2022-02-15
Payer: OTHER MISCELLANEOUS

## 2022-02-15 VITALS — WEIGHT: 200 LBS | BODY MASS INDEX: 30.31 KG/M2 | HEIGHT: 68 IN

## 2022-02-15 PROCEDURE — 99072 ADDL SUPL MATRL&STAF TM PHE: CPT

## 2022-02-15 PROCEDURE — 20550 NJX 1 TENDON SHEATH/LIGAMENT: CPT | Mod: FA

## 2022-02-15 PROCEDURE — 99214 OFFICE O/P EST MOD 30 MIN: CPT | Mod: 25

## 2022-02-15 NOTE — ASSESSMENT
[Indicate if, in your opinion, the incident that the patient described was the competent medical cause of this injury/illness.] : The incident that the patient described was the competent medical cause of this injury/illness: Yes [Indicate if the patient's complaints are consistent with his/her history of the injury/illness.] : Indicate if the patient's complaints are consistent with his/her history of the injury/illness: Yes [Yes] : Yes, it is consistent [Are there any work limitations? (If so, explain and quantify, including the anticipated duration of the limitations)] : There are work limitations. [FreeTextEntry1] : Patient is [employer] .  Patient has longstanding right carpal tunnel syndrome work related.  Patient has markedly symptomatic carpal tunnel syndrome on the right with positive electrodiagnostic testing.  The patient is refractory to conservative treatment.\par Right carpal tunnel release (96513) is medically indicated necessary.\par Right carpal tunnel surgery is scheduled for 3/18/2022.\par \par The patient has painful left trigger thumb treated today with cortisone injection.  The left trigger thumb is work-related.  If triggering continues or recurs patient will require additional treatment in the future, trigger release surgery.\par \par Patient underwent left carpal tunnel release.  Patient has decreased pain but ongoing paresthesias.  Patient has history of cervical radiculopathy with positive electrodiagnostic studies.  The altered sensation in the left hand may be due to residual median neuropathy that was not improved by carpal tunnel release and may also be related to cervical radiculopathy.\par \par The patient is not currently working and there are no immediate plans to return the patient to work as an . [Can the patient return to usual work activities as indicated? If yes, indicate date___] : The patient cannot return to usual work activities as indicated. [FreeTextEntry6] : Patient not currently working as an .\par Patient has bilateral carpal tunnel syndrome and left thumb trigger finger.\par Patient is under care of spine orthopedist for neck pain and radiation. [FreeTextEntry7] : Not capable of working at this time.

## 2022-02-15 NOTE — PHYSICAL EXAM
[de-identified] : Right-hand:\par Thumb A1 pulley not tender.\par Nodularity, minimal, associated with FPL.\par No pain, no provocable or active triggering\par There is no other A1 pulley tenderness or triggering in any finger, right hand.\par No pertinent MP, PIP, or DIP joint contributory findings, except some Heberden's nodes; none are clinically painful.\par \par Left hand:\par severe Tenderness left thumb A1 pulley; restricted movement b/o pain and guarding\par Following cortisone injection patient was able to flex and extend the thumb and triggering was evident.\par Left long finger A1 pulley has mild tenderness, no provocable triggering.  Pain is mild.\par No  other A1 pulley tenderness and no triggering in any finger.\par No pertinent MP, PIP, or DIP joint contributory findings, except some Heberden's nodes; none are clinically painful.\par \par Neurologic:\par Left hand: mild improvement numbness/tingling at rest thumb,  index, long, ring RDN. Normal other fingers.\par Slightly altered subjective sensation on the dorsum of the left hand radially.\par Right: numbness/tingling at rest thumb, index, long ring RDN; Normal UDN 4 and little\par Phalen's test with median nerve compression:  \par Negative left\par Mildly positive right \par Radial nerve motor and sensory and ulnar nerve motor and sensory are intact, bilaterally.\par \par Skin: No cyanosis, clubbing, edema or rashes.\par Vascular: Radial pulses intact.\par Lymphatic: No streaking or epitrochlear adenopathy.\par The patient is awake, alert, and oriented. Affect appropriate. Cooperative.

## 2022-02-15 NOTE — HISTORY OF PRESENT ILLNESS
[Has the patient missed work because of the injury/illness?] : The patient has missed work because of the injury/illness. [No] : The patient is currently not working. [FreeTextEntry1] : 70y/o RHD male  [employer]  who has worked with hands for many years.  The patient developed bilateral carpal tunnel syndrome. Left carpal tunnel release 5/14/2021, resulted in moderate improvement only.\par When last seen 4 months ago patient had ongoing symptoms of right carpal tunnel syndrome with painful and continuous numbness and tingling in the right with frequent exacerbations. \par Of note electrodiagnostic study 1/6/2021 showed bilateral carpal tunnel syndrome as well as bilateral C5-6 radiculopathy.\par Patient had left trigger thumb treated 11/17/2021 with cortisone injection.\par Patient states that symptoms are gone away and he was doing well until 10 days ago.  Pain has recurred.  Patient has limited motion because of the pain and guarding.\par Patient has received approval from [facility] for carpal tunnel released, right wrist. [FreeTextEntry2] :  [FreeTextEntry6] : Only activities of a [employer] [FreeTextEntry3] : Gripping pulling lifting holding [FreeTextEntry4] : Left carpal tunnel release. [FreeTextEntry5] : Right carpal tunnel cortisone injection.

## 2022-02-27 ENCOUNTER — FORM ENCOUNTER (OUTPATIENT)
Age: 72
End: 2022-02-27

## 2022-03-02 ENCOUNTER — OUTPATIENT (OUTPATIENT)
Dept: OUTPATIENT SERVICES | Facility: HOSPITAL | Age: 72
LOS: 1 days | End: 2022-03-02
Payer: COMMERCIAL

## 2022-03-02 VITALS
TEMPERATURE: 99 F | DIASTOLIC BLOOD PRESSURE: 90 MMHG | WEIGHT: 203.93 LBS | SYSTOLIC BLOOD PRESSURE: 148 MMHG | OXYGEN SATURATION: 96 % | HEIGHT: 68 IN | HEART RATE: 69 BPM | RESPIRATION RATE: 15 BRPM

## 2022-03-02 DIAGNOSIS — Z90.89 ACQUIRED ABSENCE OF OTHER ORGANS: Chronic | ICD-10-CM

## 2022-03-02 DIAGNOSIS — Z01.818 ENCOUNTER FOR OTHER PREPROCEDURAL EXAMINATION: ICD-10-CM

## 2022-03-02 DIAGNOSIS — G56.01 CARPAL TUNNEL SYNDROME, RIGHT UPPER LIMB: ICD-10-CM

## 2022-03-02 DIAGNOSIS — Z98.890 OTHER SPECIFIED POSTPROCEDURAL STATES: Chronic | ICD-10-CM

## 2022-03-02 LAB
ANION GAP SERPL CALC-SCNC: 13 MMOL/L — SIGNIFICANT CHANGE UP (ref 5–17)
BUN SERPL-MCNC: 21 MG/DL — SIGNIFICANT CHANGE UP (ref 7–23)
CALCIUM SERPL-MCNC: 9.4 MG/DL — SIGNIFICANT CHANGE UP (ref 8.4–10.5)
CHLORIDE SERPL-SCNC: 104 MMOL/L — SIGNIFICANT CHANGE UP (ref 96–108)
CO2 SERPL-SCNC: 23 MMOL/L — SIGNIFICANT CHANGE UP (ref 22–31)
CREAT SERPL-MCNC: 0.77 MG/DL — SIGNIFICANT CHANGE UP (ref 0.5–1.3)
EGFR: 97 ML/MIN/1.73M2 — SIGNIFICANT CHANGE UP
GLUCOSE SERPL-MCNC: 66 MG/DL — LOW (ref 70–99)
HCT VFR BLD CALC: 45.1 % — SIGNIFICANT CHANGE UP (ref 39–50)
HGB BLD-MCNC: 15.1 G/DL — SIGNIFICANT CHANGE UP (ref 13–17)
MCHC RBC-ENTMCNC: 31 PG — SIGNIFICANT CHANGE UP (ref 27–34)
MCHC RBC-ENTMCNC: 33.5 GM/DL — SIGNIFICANT CHANGE UP (ref 32–36)
MCV RBC AUTO: 92.6 FL — SIGNIFICANT CHANGE UP (ref 80–100)
NRBC # BLD: 0 /100 WBCS — SIGNIFICANT CHANGE UP (ref 0–0)
PLATELET # BLD AUTO: 280 K/UL — SIGNIFICANT CHANGE UP (ref 150–400)
POTASSIUM SERPL-MCNC: 4.3 MMOL/L — SIGNIFICANT CHANGE UP (ref 3.5–5.3)
POTASSIUM SERPL-SCNC: 4.3 MMOL/L — SIGNIFICANT CHANGE UP (ref 3.5–5.3)
RBC # BLD: 4.87 M/UL — SIGNIFICANT CHANGE UP (ref 4.2–5.8)
RBC # FLD: 12.5 % — SIGNIFICANT CHANGE UP (ref 10.3–14.5)
SODIUM SERPL-SCNC: 140 MMOL/L — SIGNIFICANT CHANGE UP (ref 135–145)
WBC # BLD: 7.86 K/UL — SIGNIFICANT CHANGE UP (ref 3.8–10.5)
WBC # FLD AUTO: 7.86 K/UL — SIGNIFICANT CHANGE UP (ref 3.8–10.5)

## 2022-03-02 PROCEDURE — 36415 COLL VENOUS BLD VENIPUNCTURE: CPT

## 2022-03-02 PROCEDURE — G0463: CPT

## 2022-03-02 PROCEDURE — 80048 BASIC METABOLIC PNL TOTAL CA: CPT

## 2022-03-02 PROCEDURE — 85027 COMPLETE CBC AUTOMATED: CPT

## 2022-03-02 RX ORDER — SODIUM CHLORIDE 9 MG/ML
1000 INJECTION, SOLUTION INTRAVENOUS
Refills: 0 | Status: DISCONTINUED | OUTPATIENT
Start: 2022-03-18 | End: 2022-04-01

## 2022-03-02 RX ORDER — IBUPROFEN 200 MG
1 TABLET ORAL
Qty: 0 | Refills: 0 | DISCHARGE

## 2022-03-02 RX ORDER — CHLORHEXIDINE GLUCONATE 213 G/1000ML
1 SOLUTION TOPICAL ONCE
Refills: 0 | Status: DISCONTINUED | OUTPATIENT
Start: 2022-03-18 | End: 2022-04-01

## 2022-03-02 RX ORDER — ACETAMINOPHEN 500 MG
2 TABLET ORAL
Qty: 0 | Refills: 0 | DISCHARGE

## 2022-03-02 RX ORDER — SODIUM CHLORIDE 9 MG/ML
3 INJECTION INTRAMUSCULAR; INTRAVENOUS; SUBCUTANEOUS EVERY 8 HOURS
Refills: 0 | Status: DISCONTINUED | OUTPATIENT
Start: 2022-03-18 | End: 2022-04-01

## 2022-03-02 RX ORDER — LIDOCAINE HCL 20 MG/ML
0.2 VIAL (ML) INJECTION ONCE
Refills: 0 | Status: DISCONTINUED | OUTPATIENT
Start: 2022-03-18 | End: 2022-04-01

## 2022-03-02 RX ORDER — FLUTICASONE PROPIONATE 50 MCG
1 SPRAY, SUSPENSION NASAL
Qty: 0 | Refills: 0 | DISCHARGE

## 2022-03-02 NOTE — H&P PST ADULT - NEUROLOGICAL COMMENTS
see hpi see hpi- left CTS improved.  currently c/o right CTS- numbness of left wrist, hand, fingers, pain unreleased with splint

## 2022-03-02 NOTE — H&P PST ADULT - HEALTH CARE MAINTENANCE
see PCP once a year for a physical see PCP once a year for a physical  has received flu vaccine this season

## 2022-03-02 NOTE — H&P PST ADULT - CARDIOVASCULAR
Addended by: ANTONY WEBSTER on: 3/10/2021 03:48 PM     Modules accepted: Level of Service     negative Regular rate & rhythm, normal S1, S2; no murmurs, gallops or rubs; no S3, S4

## 2022-03-02 NOTE — H&P PST ADULT - HISTORY OF PRESENT ILLNESS
This is a 70 y/o male. c/o right carpal tunnel syndrome, evaluated by Dr. Mix, s/p left carpal tunnel release 4/2021, now presents to [facility] scheduled for right CTR.   covid test scheduled on 3/15 at [facility].

## 2022-03-02 NOTE — H&P PST ADULT - GASTROINTESTINAL
Kinga Sun M.D.      1.  Let us know if you would like to get a 3T MRI of the prostate to obtain more information on your elevated PSA; if you are to have that performed, we would notify you of the results    2. Soft, non-tender, no hepatosplenomegaly, normal bowel sounds negative

## 2022-03-02 NOTE — H&P PST ADULT - ATTENDING COMMENTS
Patient is a [employer] . Patient has longstanding right carpal tunnel syndrome which is work related. Patient has markedly symptomatic carpal tunnel syndrome on the right with positive electrodiagnostic testing. The patient is refractory to conservative treatment. Right carpal tunnel release (17015) is medically indicated necessary.       Surgery, carpal tunnel release, for right carpal tunnel syndrome is indicated because of symptoms refractory to conservative treatment, interfering with sleep, and activities of daily living. Because of the duration and severity of carpal tunnel syndrome, ongoing symptoms can be expected postoperatively. While the patient may see improvement, there is no assurance of this. The possibility of little improvement or of no improvement exists. Even though it is low, the possibility of worsening exists as well. Risk of injury to the median nerve, CRPS, persistent paresthesias, wound related pain, weakness, and many other factors, reviewed and discussed.     A lengthy and detailed discussion has been held with the patient. The surgical plan, alternative treatments, and the associated risks, complications, limitations, and expectations have been discussed with the patient. Postoperative plan, need for exercising to regain motion and function, wound care, dressing care, activities, follow up, and possible need for hand therapy have been reviewed and discussed. In addition, the expectation of postop wound related pain, wound induration, swelling, weakness of , alteration of hand and finger use and function, and palmar and forearm tenderness were reviewed. In particular, the expectation of weakness, difficulty with daily activities, and wound induration often lasting six months have been stressed. All questions have been answered. The patient has expressed understanding and acceptance of the above and has consented to surgery.

## 2022-03-02 NOTE — H&P PST ADULT - NSICDXPASTSURGICALHX_GEN_ALL_CORE_FT
PAST SURGICAL HISTORY:  History of tonsillectomy     S/P carpal tunnel release left 5/2021    S/P trigger finger release left

## 2022-03-10 ENCOUNTER — FORM ENCOUNTER (OUTPATIENT)
Age: 72
End: 2022-03-10

## 2022-03-15 ENCOUNTER — OUTPATIENT (OUTPATIENT)
Dept: OUTPATIENT SERVICES | Facility: HOSPITAL | Age: 72
LOS: 1 days | End: 2022-03-15
Payer: COMMERCIAL

## 2022-03-15 DIAGNOSIS — Z90.89 ACQUIRED ABSENCE OF OTHER ORGANS: Chronic | ICD-10-CM

## 2022-03-15 DIAGNOSIS — Z98.890 OTHER SPECIFIED POSTPROCEDURAL STATES: Chronic | ICD-10-CM

## 2022-03-15 DIAGNOSIS — Z11.52 ENCOUNTER FOR SCREENING FOR COVID-19: ICD-10-CM

## 2022-03-15 LAB — SARS-COV-2 RNA SPEC QL NAA+PROBE: SIGNIFICANT CHANGE UP

## 2022-03-15 PROCEDURE — U0005: CPT

## 2022-03-15 PROCEDURE — C9803: CPT

## 2022-03-15 PROCEDURE — U0003: CPT

## 2022-03-17 ENCOUNTER — TRANSCRIPTION ENCOUNTER (OUTPATIENT)
Age: 72
End: 2022-03-17

## 2022-03-18 ENCOUNTER — APPOINTMENT (OUTPATIENT)
Dept: ORTHOPEDIC SURGERY | Facility: HOSPITAL | Age: 72
End: 2022-03-18

## 2022-03-18 ENCOUNTER — OUTPATIENT (OUTPATIENT)
Dept: OUTPATIENT SERVICES | Facility: HOSPITAL | Age: 72
LOS: 1 days | End: 2022-03-18
Payer: COMMERCIAL

## 2022-03-18 VITALS
HEIGHT: 68 IN | DIASTOLIC BLOOD PRESSURE: 73 MMHG | RESPIRATION RATE: 16 BRPM | TEMPERATURE: 97 F | HEART RATE: 59 BPM | OXYGEN SATURATION: 99 % | WEIGHT: 203.93 LBS | SYSTOLIC BLOOD PRESSURE: 143 MMHG

## 2022-03-18 VITALS
DIASTOLIC BLOOD PRESSURE: 80 MMHG | TEMPERATURE: 97 F | HEART RATE: 70 BPM | RESPIRATION RATE: 16 BRPM | OXYGEN SATURATION: 97 % | SYSTOLIC BLOOD PRESSURE: 133 MMHG

## 2022-03-18 DIAGNOSIS — Z98.890 OTHER SPECIFIED POSTPROCEDURAL STATES: Chronic | ICD-10-CM

## 2022-03-18 DIAGNOSIS — G56.01 CARPAL TUNNEL SYNDROME, RIGHT UPPER LIMB: ICD-10-CM

## 2022-03-18 DIAGNOSIS — Z90.89 ACQUIRED ABSENCE OF OTHER ORGANS: Chronic | ICD-10-CM

## 2022-03-18 PROCEDURE — 64721 CARPAL TUNNEL SURGERY: CPT | Mod: RT

## 2022-03-18 RX ORDER — IBUPROFEN 200 MG
600 TABLET ORAL ONCE
Refills: 0 | Status: DISCONTINUED | OUTPATIENT
Start: 2022-03-18 | End: 2022-04-01

## 2022-03-18 RX ORDER — CHOLECALCIFEROL (VITAMIN D3) 125 MCG
1 CAPSULE ORAL
Qty: 0 | Refills: 0 | DISCHARGE

## 2022-03-18 RX ORDER — OMEGA-3 ACID ETHYL ESTERS 1 G
1 CAPSULE ORAL
Qty: 0 | Refills: 0 | DISCHARGE

## 2022-03-18 RX ORDER — UBIDECARENONE 100 MG
1 CAPSULE ORAL
Qty: 0 | Refills: 0 | DISCHARGE

## 2022-03-18 NOTE — ASU DISCHARGE PLAN (ADULT/PEDIATRIC) - NURSING INSTRUCTIONS
LAST DOSE OF TYLENOL WAS GIVEN AT 0945AM --> NEXT DOSE OF TYLENOL IS OK TO TAKE AT 0345PM , IF NEEDED FOR PAIN. Do not exceed more than 4000mg of Tylenol in one 24 hour setting. IBUPROFEN CAN BE TAKEN AS NEEDED FOR PAIN.

## 2022-03-18 NOTE — ASU DISCHARGE PLAN (ADULT/PEDIATRIC) - CARE PROVIDER_API CALL
Garry Mix (MD)  Orthopaedic Surgery; Surgery of the Hand  [address], [facility] [address]  [place] [address]  Phone: [phone]  Fax: [phone]  Follow Up Time: 1 week

## 2022-03-18 NOTE — ASU DISCHARGE PLAN (ADULT/PEDIATRIC) - NS MD DC FALL RISK RISK
For information on Fall & Injury Prevention, visit: [email or web address] OR  [email or web address] OR  https://www.cdc.gov/steadi/patient.html

## 2022-04-05 ENCOUNTER — APPOINTMENT (OUTPATIENT)
Dept: ORTHOPEDIC SURGERY | Facility: CLINIC | Age: 72
End: 2022-04-05
Payer: OTHER MISCELLANEOUS

## 2022-04-05 PROCEDURE — 99024 POSTOP FOLLOW-UP VISIT: CPT

## 2022-04-08 ENCOUNTER — APPOINTMENT (OUTPATIENT)
Dept: ORTHOPEDIC SURGERY | Facility: CLINIC | Age: 72
End: 2022-04-08
Payer: OTHER MISCELLANEOUS

## 2022-04-08 PROCEDURE — 99214 OFFICE O/P EST MOD 30 MIN: CPT

## 2022-04-08 PROCEDURE — 99072 ADDL SUPL MATRL&STAF TM PHE: CPT

## 2022-04-08 NOTE — DISCUSSION/SUMMARY
[de-identified] : We discussed further treatment options both nonsurgical and surgical.  This point he wishes to continue with physical therapy.  He is making functional improvements with physical therapy and it is helping him.  Prescription was given to the patient.  Follow-up afterwards or sooner with any changes or worsening of his symptoms.

## 2022-04-08 NOTE — PHYSICAL EXAM
[de-identified] : Right hand\par Carpal tunnel incision wound is healed, clean and dry. No redness or sign of infection. Sutures removed.\par Normal sensation median distribution \par full range of motion without triggering\par Long finger A1 pulley mildly tender.  No active or provocable triggering\par \par Left hand\par Residual numbness in the median distribution\par carpal tunnel release 5/14/2021.\par Full motion\par No A1 pulley tenderness and no triggering in any finger.\par \par No additional pertinent positive contributory findings, bilaterally.

## 2022-04-08 NOTE — PHYSICAL EXAM
[Ataxic] : not ataxic [de-identified] : Examination of the cervical spine reveals no midline or paraspinal tenderness to palpation. No cervical lymphadenopathy. Decreased range of motion with respect to flexion, extension, rotation, and lateral bending.  Positive Spurlings. Negative Lhermitte's. Full range of motion bilateral shoulders without evidence of impingement. No instability of bilateral upper extremities.  Cranial nerves II through XII grossly intact. Intact sensation bilateral upper extremities.  Grossly preserved strength although  strength is limited on the right due to recent carpal tunnel surgery.  1+ biceps triceps and brachioradialis reflexes. Negative Hobbs's. 2+ radial pulse. . No skin lesions on the right and left upper extremities. [de-identified] : AP lateral cervical x-rays does not reveal any obvious fracture or dislocation.  Multilevel spondylosis\par \par Updated cervical spine MRI does reveal multilevel spondylosis and foraminal stenosis.  Right greater than left C3-4 foraminal stenosis and left-sided disc herniation at C6-7\par \par EMG does reveal evidence of cervical radiculopathy and carpal tunnel syndrome

## 2022-04-08 NOTE — HISTORY OF PRESENT ILLNESS
[de-identified] : Mr. TALYA JON  is a 72 year old male who presents with persistent neck pain and stiffness.  Physical therapy has helped him in the past.

## 2022-04-08 NOTE — DISCUSSION/SUMMARY
[FreeTextEntry1] : Patient is has excellent result after right carpal tunnel release.  Patient reports normal sensation in the right hand median distribution, complete recovery in terms of return of sensation.\par Patient has mild pain Long finger A1 pulley without triggering.\par I propose cortisone injection for left long finger A1 pulley tenderness which is likely early trigger finger.\par Patient is needle averse and declines cortisone injection for right long finger which was suggested.\par \par Patient has some residual numbness in the left thumb index long finger following left carpal tunnel release.  Patient notes that sensation is much improved but has not returned to normal.\par \par Patient has ongoing neck pain and will be seeing Isacc Trotter MD later this week.\par \par No other treatment required for carpal tunnel syndrome in either hand.\par Patient should return if right long trigger finger is symptomatic.\par If so I will recommend cortisone injection once again.\par I have advised patient that if he develops trigger finger and leaves it untreated for any length of time, and the statistical chance for resolution with steroid injection decreases in the likelihood that he might require surgical treatment increases.\par Prognosis limited.\par \par A lengthy and detailed discussion was held with the patient regarding analysis, treatment, and recommendations. All questions have been answered. At the conclusion the patient expressed understanding but not complete acceptance.

## 2022-05-05 ENCOUNTER — APPOINTMENT (OUTPATIENT)
Dept: ORTHOPEDIC SURGERY | Facility: CLINIC | Age: 72
End: 2022-05-05
Payer: OTHER MISCELLANEOUS

## 2022-05-05 DIAGNOSIS — M65.331 TRIGGER FINGER, RIGHT MIDDLE FINGER: ICD-10-CM

## 2022-05-05 PROCEDURE — 99024 POSTOP FOLLOW-UP VISIT: CPT

## 2022-05-05 NOTE — PHYSICAL EXAM
[de-identified] : Right hand\par Carpal tunnel incision is fully healed.\par There is considerable induration surrounding the wound 3 cm in all directions especially ulnarly and less radially or distally.\par Full flexion and extension of fingers.\par Subjective sensation intact.\par Patient describes an occasional shooting pain that radiates distally or proximally or both.\par With active range of motion patient has some radiation proximally\par Right long finger A1 pulley minimally tender.\par No evidence of active or provocable triggering in this digit.\par There is no other A1 pulley tenderness or triggering in any other finger, right hand.\par \par Left hand\par Surgical area is soft and nontender\par Residual decreased sensation in the thumb and index finger primarily long finger to a lesser degree\par Subjectively the sensation is better than it was prior to surgery.\par Normal sensation elsewhere\par Full range of motion of the fingers of the left hand\par Left thumb A1 pulley nontender.\par No A1 pulley tenderness and no triggering in any finger.\par Basal joint manipulation minimal crepitus minimal discomfort\par \par Skin: No cyanosis, clubbing, edema or rashes.\par Vascular: Radial pulses intact.\par Lymphatic: No streaking or epitrochlear adenopathy.\par The patient is awake, alert, and oriented. Affect appropriate. Cooperative.

## 2022-05-05 NOTE — ASSESSMENT
[FreeTextEntry1] : The patient is 7 weeks status post right carpal tunnel release.  Patient has return of normal sensation and relief of paresthesias and night pain.\par Patient has considerable induration and sensitivity in the region of the carpal tunnel release in the right palm.  Patient would benefit from hand therapy for wound desensitization, modalities to treat the induration.  Because patient has full range of motion range of motion exercises and strengthening are not necessary.  In particular I am concerned that strengthening could exacerbate the palmar sensitivity and should not be undertaken at this time.\par Patient is not currently working and believes that he is unable to return to work.\par Given the patient's current status this is reasonable that he is not able to work and is currently disabled for work as an . [Indicate if, in your opinion, the incident that the patient described was the competent medical cause of this injury/illness.] : The incident that the patient described was the competent medical cause of this injury/illness: Yes [Indicate if the patient's complaints are consistent with his/her history of the injury/illness.] : Indicate if the patient's complaints are consistent with his/her history of the injury/illness: Yes [Yes] : Yes, it is consistent [Can the patient return to usual work activities as indicated? If yes, indicate date___] : The patient cannot return to usual work activities as indicated. [FreeTextEntry6] : Patient is not currently able to work as an . [FreeTextEntry7] : Unable to work at this time.  Duration of disability is uncertain.

## 2022-05-05 NOTE — HISTORY OF PRESENT ILLNESS
[FreeTextEntry1] : Patient is 72-year-old RHD  who presents for 2nd postop visit following right carpal tunnel release 3/18/2022.\par Prior left carpal tunnel release 5/14/2021.\par Patient complains of sensitivity induration surrounding the right carpal tunnel incision.  Patient describes radiation of pain proximally and distally.\par Patient reports that he has full restoration of sensation in the right hand.\par Patient reports no change on the left with decreased sensation in the thumb index long finger.  Patient acknowledges however that the sensation is better now than it was preoperatively.\par Today right long finger is not problematic and there is no triggering.\par Patient would like to have hand therapy for the sensitive right palm.\par \par Patient is not currently working.\par \par \par Patient has been noted to have cervical radiculopathy and cervical spine stenosis by Isacc Trotter MD who saw the patient 4/8/2022.  Patient had reported improvement with physical therapy.  Therefore, physical therapy was recommended and accepted by patient at the end of the office visit.  Patient states that he has had reached his limit and physical therapy for the cervical radiculopathy. [Has the patient missed work because of the injury/illness?] : The patient has missed work because of the injury/illness. [No] : The patient is currently not working.

## 2022-05-11 ENCOUNTER — APPOINTMENT (OUTPATIENT)
Dept: ORTHOPEDIC SURGERY | Facility: CLINIC | Age: 72
End: 2022-05-11
Payer: OTHER MISCELLANEOUS

## 2022-05-11 PROCEDURE — 99072 ADDL SUPL MATRL&STAF TM PHE: CPT

## 2022-05-11 PROCEDURE — 99214 OFFICE O/P EST MOD 30 MIN: CPT

## 2022-05-11 NOTE — HISTORY OF PRESENT ILLNESS
[de-identified] : Mr. TALYA JON  is a 72 year old male who presents with persistent neck pain and stiffness.  Physical therapy has helped him in the past, but he has not been doing it since it has been denied.

## 2022-05-11 NOTE — PHYSICAL EXAM
[Ataxic] : not ataxic [de-identified] : Examination of the cervical spine reveals no midline or paraspinal tenderness to palpation. No cervical lymphadenopathy. Decreased range of motion with respect to flexion, extension, rotation, and lateral bending.  Positive Spurlings. Negative Lhermitte's. Full range of motion bilateral shoulders without evidence of impingement. No instability of bilateral upper extremities.  Cranial nerves II through XII grossly intact. Intact sensation bilateral upper extremities. 5/5 deltoids biceps triceps wrist extensors wrist flexors finger flexors and hand intrinsics.  1+ biceps triceps and brachioradialis reflexes. Negative Hobbs's. 2+ radial pulse. . No skin lesions on the right and left upper extremities. [de-identified] : AP lateral cervical x-rays does not reveal any obvious fracture or dislocation.  Multilevel spondylosis\par \par Updated cervical spine MRI does reveal multilevel spondylosis and foraminal stenosis.  Right greater than left C3-4 foraminal stenosis and left-sided disc herniation at C6-7\par \par EMG does reveal evidence of cervical radiculopathy and carpal tunnel syndrome

## 2022-05-11 NOTE — PHYSICAL EXAM
[Ataxic] : not ataxic [de-identified] : Examination of the cervical spine reveals no midline or paraspinal tenderness to palpation. No cervical lymphadenopathy. Decreased range of motion with respect to flexion, extension, rotation, and lateral bending.  Positive Spurlings. Negative Lhermitte's. Full range of motion bilateral shoulders without evidence of impingement. No instability of bilateral upper extremities.  Cranial nerves II through XII grossly intact. Intact sensation bilateral upper extremities.  Grossly preserved strength although  strength is limited on the right due to recent carpal tunnel surgery.  1+ biceps triceps and brachioradialis reflexes. Negative Hobbs's. 2+ radial pulse. . No skin lesions on the right and left upper extremities. [de-identified] : AP lateral cervical x-rays does not reveal any obvious fracture or dislocation.  Multilevel spondylosis\par \par Updated cervical spine MRI does reveal multilevel spondylosis and foraminal stenosis.  Right greater than left C3-4 foraminal stenosis and left-sided disc herniation at C6-7\par \par EMG does reveal evidence of cervical radiculopathy and carpal tunnel syndrome

## 2022-05-11 NOTE — DISCUSSION/SUMMARY
[de-identified] : We again discussed further treatment options both nonsurgical and surgical.  This point he wished to continue with nonsurgical treatment.   He is making functional improvements with physical therapy and it is helping him. Prescription was given to the patient.Follow-up afterwards or sooner with any changes or worsening of his symptoms.

## 2022-05-11 NOTE — DISCUSSION/SUMMARY
[de-identified] : We discussed further treatment options both nonsurgical and surgical.   He is making functional improvements with physical therapy and it is helping him.  He wished to continue with physical therapy.  Follow-up afterwards or sooner with any changes or worsening of his symptoms.

## 2022-06-26 NOTE — HISTORY OF PRESENT ILLNESS
[FreeTextEntry1] : Patient is 72-year-old RHD  who presents for first postop visit following right carpal tunnel release 3/18/2022.\par Prior left carpal tunnel release 5/14/2021.\par \par TODAY:\par Patient reports relief of symptoms of numbness and tingling\par The patient reports that he has normal feeling in the right hand.\par Patient reports that he has minimal discomfort in the surgical area.\par \par Patient has some left long finger symptoms;  tenderness at A1 pulley.\par There is no triggering of long finger.\par Patient reports ongoing altered sensation left hand median distribution.  This is not notably changed.\par The patient has not been working and is not currently working.\par 
26-Jun-2022 10:06

## 2022-06-29 ENCOUNTER — APPOINTMENT (OUTPATIENT)
Dept: ORTHOPEDIC SURGERY | Facility: CLINIC | Age: 72
End: 2022-06-29

## 2022-06-29 VITALS — BODY MASS INDEX: 30.31 KG/M2 | HEIGHT: 68 IN | WEIGHT: 200 LBS

## 2022-06-29 PROCEDURE — 99214 OFFICE O/P EST MOD 30 MIN: CPT

## 2022-06-29 PROCEDURE — 99072 ADDL SUPL MATRL&STAF TM PHE: CPT

## 2022-06-30 NOTE — HISTORY OF PRESENT ILLNESS
[FreeTextEntry1] :  Patient is 72-year-old RHD  underwent right carpal tunnel release 3/18/2022.\par Prior left carpal tunnel release 5/14/2021.\par At previous visit, 5/5/2022, patient had wound related tenderness\par Patient reported full restoration of sensation in the right hand.\par Patient reports no change on the left with decreased sensation in the thumb index long finger. Patient acknowledges however that the sensation is better now than it was preoperatively.\par Patient saw Dr. Trotter 5/11/2022.  Cervical radiculopathy was noted and symptoms associated with cervical radiculopathy were also noted and discussed with patient by Dr. Trotter.\par 5/5/2022 office visit the right long finger was not triggering and not a problem.\par Patient was referred to hand therapy.\par Patient has not returned to work as an .\par \par TODAY:\par Patient has been doing hand therapy with [facility]auth[facility].\par Palmar pain in palm is decreasing.\par Patient reports full sensation in the fingers of the right hand.\par Patient has been doing physical therapy for cervical spine, from which he feels improvement.\par Left hand: ongoing decreased sensation thumb and index.\par Long finger nearly normal sensation.\par \par Patient is insistent that he is unable to return to work because of the weakness in the hands, the tenderness in the surgical area, the numbness in the left hand, and because of back pain. [FreeTextEntry2] :  [FreeTextEntry3] : Gripping, pulling, holding, heavy lifting [FreeTextEntry4] : Left carpal tunnel release 5/14/2021, right carpal tunnel release 3/18/2022\par Patient has marked improvement on the right.  Ongoing numbness and tingling in the left possibly due to residual in neuropathy secondary to carpal tunnel syndrome possibly related to cervical radiculopathy or both. [Has the patient missed work because of the injury/illness?] : The patient has missed work because of the injury/illness. [No] : The patient is currently not working. [FreeTextEntry6] : I do not know this date.

## 2022-06-30 NOTE — ASSESSMENT
[Indicate if, in your opinion, the incident that the patient described was the competent medical cause of this injury/illness.] : The incident that the patient described was the competent medical cause of this injury/illness: Yes [Indicate if the patient's complaints are consistent with his/her history of the injury/illness.] : Indicate if the patient's complaints are consistent with his/her history of the injury/illness: Yes [Yes] : Yes, it is consistent [Physical Disability Temporary Total] : temporarily total disabled [FreeTextEntry1] : The patient is status post right carpal tunnel release.  Patient has return of normal sensation and relief of paresthesias and night pain, on the right.\par Patient has less induration and less tenderness in the region of the carpal tunnel release in the right palm than at most recent visit.  Patient does not require additional hand therapy.  The palmar induration will decrease over time and the sensitivity will decrease over time.  How much longer it will be cannot be predicted.  Because patient has full range of motion range of motion exercises and strengthening are not necessary. \par Patient has ongoing numbness in the left hand, thumb, index, long fingers.\par It is possible that the numbness in the left hand is due to both cervical radiculopathy as well as carpal tunnel syndrome.\par Patient states that he is unable to feel tools fully and feels that he is unsafe to return to work because of the numbness in the left hand.\par Unrelated to the carpal tunnel syndrome the patient complains of considerable neck stiffness and feels that he is unsafe to work because of the neck stiffness.\par Patient also complains of back pain and inability work. \par Patient states that he is unable to work because of these several different problems\par Patient is not currently working and believes that he is unable to return to work, and definitely.\par Given the patient's current status this is reasonable that he is not able to work and is currently disabled for work as an . [Can the patient return to usual work activities as indicated? If yes, indicate date___] : The patient cannot return to usual work activities as indicated. [FreeTextEntry5] : 100 [FreeTextEntry7] : Patient is unable to work as an .\par I am unable to project if or when the patient will be able to return to work.

## 2022-06-30 NOTE — PHYSICAL EXAM
[de-identified] : Right hand\par Carpal tunnel incision is fully healed.\par Less induration than prior visit\par Mild tenderness\par Full flexion and extension of fingers.\par Subjective sensation intact all fingers left hand.\par no more shooting pain\par With active range of motion patient has some radiation proximally\par Right long finger A1 pulley minimally tender.\par No evidence of active or provocable triggering in this digit.\par There is no other A1 pulley tenderness or triggering in any other finger, right hand.\par \par Left hand\par Surgical area is soft and nontender\par Residual decreased sensation in the thumb and index finger primarily; long finger subjective light touch sensation nearly normal.\par Subjectively the sensation is better than it was prior to surgery.\par Normal sensation elsewhere\par Full range of motion of the fingers of the left hand\par Left thumb A1 pulley nontender.\par No A1 pulley tenderness and no triggering in any finger.\par Basal joint manipulation minimal crepitus minimal discomfort\par \par Skin: No cyanosis, clubbing, edema or rashes.\par Vascular: Radial pulses intact.\par Lymphatic: No streaking or epitrochlear adenopathy.\par The patient is awake, alert, and oriented. Affect appropriate. Cooperative.

## 2022-07-08 ENCOUNTER — APPOINTMENT (OUTPATIENT)
Dept: ORTHOPEDIC SURGERY | Facility: CLINIC | Age: 72
End: 2022-07-08

## 2022-07-13 ENCOUNTER — APPOINTMENT (OUTPATIENT)
Dept: ORTHOPEDIC SURGERY | Facility: CLINIC | Age: 72
End: 2022-07-13

## 2022-07-13 VITALS — HEIGHT: 68 IN | BODY MASS INDEX: 30.31 KG/M2 | WEIGHT: 200 LBS

## 2022-07-13 DIAGNOSIS — M54.2 CERVICALGIA: ICD-10-CM

## 2022-07-13 DIAGNOSIS — M25.611 STIFFNESS OF RIGHT SHOULDER, NOT ELSEWHERE CLASSIFIED: ICD-10-CM

## 2022-07-13 DIAGNOSIS — M25.612 STIFFNESS OF LEFT SHOULDER, NOT ELSEWHERE CLASSIFIED: ICD-10-CM

## 2022-07-13 DIAGNOSIS — R52 OTHER INJURY OF UNSPECIFIED BODY REGION, INITIAL ENCOUNTER: ICD-10-CM

## 2022-07-13 DIAGNOSIS — G89.29 CERVICALGIA: ICD-10-CM

## 2022-07-13 DIAGNOSIS — R23.4 CHANGES IN SKIN TEXTURE: ICD-10-CM

## 2022-07-13 DIAGNOSIS — T14.8XXA OTHER INJURY OF UNSPECIFIED BODY REGION, INITIAL ENCOUNTER: ICD-10-CM

## 2022-07-13 PROCEDURE — 99214 OFFICE O/P EST MOD 30 MIN: CPT

## 2022-07-13 PROCEDURE — 99072 ADDL SUPL MATRL&STAF TM PHE: CPT

## 2022-07-14 NOTE — PHYSICAL EXAM
[de-identified] : Right shoulder\par Considerable pain with maximum abduction and internal rotation\par slightly reduced external and internal rotation with mild discomfort\par Forward elevation, mild pain at maximum forward elevation\par No shoulder girdle tenderness\par \par Left shoulder\par Limited forward elevation with pain\par Pain at maximum shoulder abduction and shoulder internal rotation\par No shoulder tenderness\par Slightly reduced internal and external rotation\par \par \par \par Right hand\par Carpal tunnel incision is fully healed.\par Less induration than prior visit\par Minimal tenderness\par Full flexion and extension of fingers.\par Subjective sensation intact all fingers left hand.\par no more shooting pain\par With active range of motion patient has some radiation proximally\par Right long finger A1 pulley minimally tender.\par No evidence of active or provocable triggering in this digit.\par There is no other A1 pulley tenderness or triggering in any other finger, right hand.\par \par Left hand\par Surgical area is soft and nontender\par Residual decreased sensation in the thumb and index finger primarily; long finger subjective light touch sensation nearly normal.\par Subjectively the sensation is better than it was prior to surgery.\par Normal sensation elsewhere including dorsally\par Full range of motion of the fingers of the left hand\par Left thumb A1 pulley nontender.\par No A1 pulley tenderness and no triggering in any finger.\par Basal joint manipulation minimal crepitus minimal discomfort\par \par Skin: No cyanosis, clubbing, edema or rashes.\par Vascular: Radial pulses intact.\par Lymphatic: No streaking or epitrochlear adenopathy.\par The patient is awake, alert, and oriented. Affect appropriate. Cooperative.

## 2022-07-14 NOTE — HISTORY OF PRESENT ILLNESS
[FreeTextEntry1] : Patient is 72-year-old RHD  underwent right carpal tunnel release 3/18/2022.\par Prior left carpal tunnel release 5/14/2021.\par Patient reported full restoration of sensation in the right hand.\par Patient reported no change on the left with decreased sensation in the thumb index long finger. \par Patient saw Dr. Trotter 5/11/2022. Cervical radiculopathy was noted and symptoms associated with cervical radiculopathy were also noted and discussed with patient by Dr. Trotter.\par Patient was doing hand therapy with [facility] Gauthier [facility] and was noting improvement.\par Patient previously seen 6/29/2022 and reported normal sensation in the right with relief of paresthesias and night pain with decreasing induration surrounding the incision.  Because of improvement and therapy was judged not necessary and was discontinued.\par At that visit the patient stated that he felt that he was unable to handle tools safely and properly was unable to return to work.\par Patient also complained of neck stiffness and neck pain.  Because of the symptoms felt that this was an added reason that he was unable to return to work.\par Patient was judged to be disabled for work as an .\par \par TODAY:\par Patient states that when he holds the phone,\par With elbow flexed the entire right forearm and hand become numb as proximal as elbow.\par Patient states that when he holds a phone with the elbow flexed the entire left forearm and hand become numb as proximal as the elbow.\par Patient states that this also happens if he maintains a position for prolonged period of time.\par The patient leaning on the right arm when he sleeps he has a similar symptoms.\par  [Has the patient missed work because of the injury/illness?] : The patient has missed work because of the injury/illness. [No] : The patient is currently not working.

## 2022-07-14 NOTE — ASSESSMENT
[FreeTextEntry1] : Patient has pain and paresthesias in the right forearm and hand when holding a telephone or similar gripping for prolonged period of time, bilaterally.  The explanation for this is unclear.\par Patient also perceives pain across the upper back into the shoulder.\par In addition to cervical radiculopathy patient has pain with maximum shoulder abduction and internal rotation and some pain with forward elevation left more painful than right.  While it is most likely related to cervical spine disease and radiculopathy there is a possibility the patient may have inherent shoulder pathology such as rotator cuff disorder or pain from limited internal rotation and shoulder range of motion.\par Patient advised to reconsult Isacc Trotter MD to assess neck and determine if the shoulder pain is related to neck or to see if the shoulder pain is independent of the neck.  If independent of neck then patient should consult orthopedic sports medicine specialist for assessment and treatment recommendations.\par I have explained to patient that some of the shoulder pain may well be related to natural tissue deterioration and aging and may not be remedied.\par No additional treatment is required for the hands at this time.\par Pt plans to officially retire at [employer] as of August 1, 2022.  [Indicate if, in your opinion, the incident that the patient described was the competent medical cause of this injury/illness.] : The incident that the patient described was the competent medical cause of this injury/illness: Yes [Indicate if the patient's complaints are consistent with his/her history of the injury/illness.] : Indicate if the patient's complaints are consistent with his/her history of the injury/illness: Yes [Yes] : Yes, it is consistent [Can the patient return to usual work activities as indicated? If yes, indicate date___] : The patient cannot return to usual work activities as indicated. [FreeTextEntry5] : 100 [FreeTextEntry6] : P [FreeTextEntry7] : pt may return to work after 8/1/2022 with limitations.\par No use with either hand greater than 5 pounds  or lifting.\par Avoid repetitive uses.

## 2022-08-22 ENCOUNTER — APPOINTMENT (OUTPATIENT)
Dept: ORTHOPEDIC SURGERY | Facility: CLINIC | Age: 72
End: 2022-08-22

## 2022-08-22 PROCEDURE — 99072 ADDL SUPL MATRL&STAF TM PHE: CPT

## 2022-08-22 PROCEDURE — 99214 OFFICE O/P EST MOD 30 MIN: CPT

## 2022-08-22 NOTE — DISCUSSION/SUMMARY
[de-identified] : We discussed further treatment options.  Overall his symptoms have improved significantly with physical therapy.  Physical therapy is completed.  At this time he has been given home exercises and has been doing these diligently.  He will follow-up with me in 6 to 8 weeks time or sooner with any changes or worsening of his symptoms.

## 2022-08-22 NOTE — HISTORY OF PRESENT ILLNESS
[de-identified] : Mr. TALYA JON  is a 72 year old male who presents with persistent neck pain and stiffness.  His arms feel better.  He has been doing physical therapy.

## 2022-08-22 NOTE — PHYSICAL EXAM
[Ataxic] : not ataxic [de-identified] : Examination of the cervical spine reveals no midline or paraspinal tenderness to palpation. No cervical lymphadenopathy. Decreased range of motion with respect to flexion, extension, rotation, and lateral bending.  Negative Spurlings. Negative Lhermitte's. Full range of motion bilateral shoulders without evidence of impingement. No instability of bilateral upper extremities.  Cranial nerves II through XII grossly intact. Intact sensation bilateral upper extremities.  Grossly preserved strength although  strength is limited on the right due to recent carpal tunnel surgery.  1+ biceps triceps and brachioradialis reflexes. Negative Hobbs's. 2+ radial pulse. . No skin lesions on the right and left upper extremities. [de-identified] : AP lateral cervical x-rays does not reveal any obvious fracture or dislocation.  Multilevel spondylosis\par \par Updated cervical spine MRI does reveal multilevel spondylosis and foraminal stenosis.  Right greater than left C3-4 foraminal stenosis and left-sided disc herniation at C6-7\par \par EMG does reveal evidence of cervical radiculopathy and carpal tunnel syndrome

## 2022-10-24 ENCOUNTER — APPOINTMENT (OUTPATIENT)
Dept: ORTHOPEDIC SURGERY | Facility: CLINIC | Age: 72
End: 2022-10-24

## 2022-10-24 VITALS
TEMPERATURE: 97.6 F | WEIGHT: 200 LBS | BODY MASS INDEX: 30.31 KG/M2 | HEART RATE: 82 BPM | SYSTOLIC BLOOD PRESSURE: 145 MMHG | OXYGEN SATURATION: 98 % | HEIGHT: 68 IN | DIASTOLIC BLOOD PRESSURE: 76 MMHG

## 2022-10-24 PROCEDURE — 99072 ADDL SUPL MATRL&STAF TM PHE: CPT

## 2022-10-24 PROCEDURE — 99214 OFFICE O/P EST MOD 30 MIN: CPT

## 2022-10-24 NOTE — DISCUSSION/SUMMARY
[de-identified] : We again discussed further treatment options both nonsurgical and surgical.  He has been doing home exercises with some limited relief.  We discussed epidural injections and even surgery.  He does not want undergo surgery.  At this point he would like to continue with home exercises.  He will follow-up in 6 to 8 weeks time or sooner with any changes or worsening of his symptoms.

## 2022-10-24 NOTE — HISTORY OF PRESENT ILLNESS
[de-identified] : Mr. TALYA JON  is a 72 year old male who presents to the office for a follow-up visit.  He was doing home exercises and he feels he was doing them too vigorously and his symptoms worsen.

## 2023-01-11 ENCOUNTER — APPOINTMENT (OUTPATIENT)
Dept: ORTHOPEDIC SURGERY | Facility: CLINIC | Age: 73
End: 2023-01-11
Payer: OTHER MISCELLANEOUS

## 2023-01-11 VITALS
BODY MASS INDEX: 30.31 KG/M2 | DIASTOLIC BLOOD PRESSURE: 79 MMHG | SYSTOLIC BLOOD PRESSURE: 175 MMHG | HEIGHT: 68 IN | WEIGHT: 200 LBS | HEART RATE: 69 BPM | TEMPERATURE: 97.8 F

## 2023-01-11 PROCEDURE — 99072 ADDL SUPL MATRL&STAF TM PHE: CPT

## 2023-01-11 PROCEDURE — 99214 OFFICE O/P EST MOD 30 MIN: CPT

## 2023-01-11 NOTE — HISTORY OF PRESENT ILLNESS
[de-identified] : Mr. TALYA JON  is a 72 year old male who presents to the office for a follow-up visit.  He is doing a home exercises and feels the same.  However, he feels his hands are stronger.

## 2023-01-11 NOTE — PHYSICAL EXAM
[Ataxic] : not ataxic [de-identified] : Examination of the cervical spine reveals no midline or paraspinal tenderness to palpation. No cervical lymphadenopathy. Decreased range of motion with respect to flexion, extension, rotation, and lateral bending.  Negative Spurlings. Negative Lhermitte's. Full range of motion bilateral shoulders without evidence of impingement. No instability of bilateral upper extremities.  Cranial nerves II through XII grossly intact. Intact sensation bilateral upper extremities.  Grossly preserved strength bilaterally.  He may have some slight weakness in his left triceps.  1+ biceps triceps and brachioradialis reflexes. Negative Hobbs's. 2+ radial pulse. . No skin lesions on the right and left upper extremities. [de-identified] : AP lateral cervical x-rays does not reveal any obvious fracture or dislocation.  Multilevel spondylosis\par \par Updated cervical spine MRI does reveal multilevel spondylosis and foraminal stenosis.  Right greater than left C3-4 foraminal stenosis and left-sided disc herniation at C6-7\par \par EMG does reveal evidence of cervical radiculopathy and carpal tunnel syndrome

## 2023-01-11 NOTE — DISCUSSION/SUMMARY
[de-identified] : We again discussed further treatment options both nonsurgical and surgical.  We discussed injections, therapy, and surgery.  He would like to try some additional physical therapy.  Prescription was given.  Follow-up afterwards or sooner any changes or worsening of his symptoms.

## 2023-02-14 ENCOUNTER — APPOINTMENT (OUTPATIENT)
Dept: ORTHOPEDIC SURGERY | Facility: CLINIC | Age: 73
End: 2023-02-14
Payer: OTHER MISCELLANEOUS

## 2023-02-14 PROCEDURE — 99072 ADDL SUPL MATRL&STAF TM PHE: CPT

## 2023-02-14 PROCEDURE — 99455 WORK RELATED DISABILITY EXAM: CPT

## 2023-02-14 RX ORDER — NAPROXEN SODIUM 220 MG
TABLET ORAL
Refills: 0 | Status: ACTIVE | COMMUNITY

## 2023-02-14 RX ORDER — MUPIROCIN 20 MG/G
2 OINTMENT TOPICAL
Qty: 22 | Refills: 0 | Status: DISCONTINUED | COMMUNITY
Start: 2021-02-19 | End: 2023-02-14

## 2023-02-14 RX ORDER — CEPHALEXIN 500 MG/1
500 CAPSULE ORAL
Qty: 20 | Refills: 0 | Status: DISCONTINUED | COMMUNITY
Start: 2022-03-08 | End: 2023-02-14

## 2023-02-14 RX ORDER — KETOCONAZOLE 20 MG/G
2 CREAM TOPICAL
Qty: 60 | Refills: 0 | Status: DISCONTINUED | COMMUNITY
Start: 2021-02-16 | End: 2023-02-14

## 2023-02-14 RX ORDER — CICLOPIROX 80 MG/ML
8 SOLUTION TOPICAL
Qty: 7 | Refills: 0 | Status: DISCONTINUED | COMMUNITY
Start: 2021-02-16 | End: 2023-02-14

## 2023-02-15 NOTE — ASSESSMENT
[FreeTextEntry1] : Patient underwent right carpal tunnel release and left carpal tunnel release for work-related condition.\par Patient has restoration of sensation in the right hand but still has some weakness.\par Patient has 5 pounds of pinch power on the right\par Pinch strength on the left 3.5 pounds.\par Patient has reduced sensation in the left thumb and index fingers permanently although subjectively somewhat better than preoperatively.\par \par Patient has bilateral carpal tunnel syndrome with subsequent deficits of pinch strength bilaterally and of sensation on the left.\par \par SCHEDULED LOSS of USE determination:\par See summary of treatment and physical findings described above:\par \par Bilateral carpal tunnel syndrome status post bilateral carpal tunnel release surgery.\par Total degree of disability for both right and left hands: 25%\par \par Right hand loss of use: 12.5%\par Left hand loss of use 12.5%\par \par Patient states that he is retired from work as an .\par No further treatment indicated for carpal tunnel syndrome.

## 2023-02-15 NOTE — HISTORY OF PRESENT ILLNESS
[FreeTextEntry1] : Patient is 72-year-old RHD  underwent right carpal tunnel release 3/18/2022.\par Prior left carpal tunnel release 5/14/2021.\par Patient reported full restoration of sensation in the right hand.\par Patient reported no change on the left with decreased sensation in the thumb index long finger. \par Patient saw Isacc Trotter MD 1/11/2023 and was noted to have cervical spinal stenosis and cervical radiculopathy.  Patient was advised that there were both nonsurgical and surgical treatments.  Patient was referred to physical therapy for ongoing exercise program.\par \par Patient most recently seen 7/13/2022 reporting pain and paresthesias in right forearm and hand, upper back pain, shoulder pain.\par Patient was judged not to require additional hand treatment.\par Patient stated that he was planning to retire August 1, 2022 as an  in [address].\par \par TODAY:\par Patient returns today for hand evaluation.\par Patient perceives loss of power in the right hand such as opening a From a bottle or milk carton.\par Patient states that the feeling/sensation in the right hand is "fine".\par Patient still has some residual abnormal feeling in the left thumb and index finger.\par \par  [FreeTextEntry2] :   [FreeTextEntry6] : All the duties of an , [place], [employer]  [FreeTextEntry3] : Gripping, pulling, holding, heavy lifting  [FreeTextEntry4] : Previous treatment for the injury/illness including hospitalization and/or surgery and reported patient response: Left carpal tunnel release 5/14/2021, right carpal tunnel release 3/18/2022  [FreeTextEntry5] : Patient under care of Isacc Trotter MD for cervical spine stenosis and radiculopathy.\par Cervical spine radiographs demonstrate multiple level spondylosis.\par Updated cervical spine MRI does reveal multilevel spondylosis and foraminal stenosis. Right greater than left C3-4 foraminal stenosis and left-sided disc herniation at C6-7\par EMG: evidence of cervical radiculopathy and carpal tunnel syndrome.

## 2023-02-15 NOTE — PHYSICAL EXAM
[de-identified] : Right shoulder\par moderate Considerable pain with maximum abduction and internal rotation\par slightly reduced external and internal rotation with mild discomfort\par Forward elevation, mild pain at maximum forward elevation\par No shoulder girdle tenderness\par \par Left shoulder\par Limited forward elevation with pain\par Pain at maximum shoulder abduction and shoulder internal rotation\par No shoulder tenderness\par Slightly reduced internal and external rotation\par \par Right hand\par Carpal tunnel incision is fully healed.\par Less induration than prior visit\par Minimal tenderness\par Right thumb pinch power 5 pounds\par Full flexion and extension of fingers.\par Subjective sensation intact all fingers right hand.\par no more shooting pain\par With active range of motion patient has some radiation proximally\par Right long finger A1 pulley minimally tender.\par No evidence of active or provocable triggering in this digit.\par There is no other A1 pulley tenderness or triggering in any other finger, right hand.\par \par Left hand\par Surgical area is soft and nontender\par Residual decreased sensation in the thumb and index finger primarily; long finger subjective light touch sensation nearly normal.\par Subjectively the sensation is better than it was prior to surgery.\par Normal sensation elsewhere including dorsally\par Pinch power left hand: 3.5 pounds\par \par Full range of motion of the fingers of the left hand\par Left thumb A1 pulley nontender.\par No A1 pulley tenderness and no triggering in any finger.\par Basal joint manipulation minimal crepitus minimal discomfort\par \par Skin: No cyanosis, clubbing, edema or rashes.\par Vascular: Radial pulses intact.\par Lymphatic: No streaking or epitrochlear adenopathy.\par The patient is awake, alert, and oriented. Affect appropriate. Cooperative.

## 2023-03-29 ENCOUNTER — APPOINTMENT (OUTPATIENT)
Dept: ORTHOPEDIC SURGERY | Facility: CLINIC | Age: 73
End: 2023-03-29
Payer: OTHER MISCELLANEOUS

## 2023-03-29 VITALS
BODY MASS INDEX: 30.31 KG/M2 | TEMPERATURE: 97.4 F | HEIGHT: 68 IN | OXYGEN SATURATION: 96 % | HEART RATE: 72 BPM | SYSTOLIC BLOOD PRESSURE: 159 MMHG | DIASTOLIC BLOOD PRESSURE: 78 MMHG | WEIGHT: 200 LBS

## 2023-03-29 PROCEDURE — 99214 OFFICE O/P EST MOD 30 MIN: CPT

## 2023-03-29 NOTE — PHYSICAL EXAM
[Ataxic] : not ataxic [de-identified] : Examination of the cervical spine reveals no midline or paraspinal tenderness to palpation. No cervical lymphadenopathy. Decreased range of motion with respect to flexion, extension, rotation, and lateral bending.  Negative Spurlings. Negative Lhermitte's. Full range of motion bilateral shoulders without evidence of impingement. No instability of bilateral upper extremities.  Cranial nerves II through XII grossly intact. Intact sensation bilateral upper extremities.  Grossly preserved strength bilaterally.  He may have some slight weakness in his left triceps.  1+ biceps triceps and brachioradialis reflexes. Negative Hobbs's. 2+ radial pulse. . No skin lesions on the right and left upper extremities. [de-identified] : AP lateral cervical x-rays does not reveal any obvious fracture or dislocation.  Multilevel spondylosis\par \par Updated cervical spine MRI does reveal multilevel spondylosis and foraminal stenosis.  Right greater than left C3-4 foraminal stenosis and left-sided disc herniation at C6-7\par \par EMG does reveal evidence of cervical radiculopathy and carpal tunnel syndrome

## 2023-03-29 NOTE — DISCUSSION/SUMMARY
[de-identified] : We again discussed further treatment options.  Overall, he continues to improve.  He will continue with therapy.  He will let me know of any changes or worsening of his symptoms.

## 2023-03-29 NOTE — HISTORY OF PRESENT ILLNESS
[de-identified] : Mr. TALYA JON  is a 72 year old male who presents to the office for a follow-up visit.  He finished PT the first week of February and  is doing a home exercises.  He feels he is moving his arms better.

## 2023-04-04 ENCOUNTER — APPOINTMENT (OUTPATIENT)
Dept: ORTHOPEDIC SURGERY | Facility: CLINIC | Age: 73
End: 2023-04-04
Payer: MEDICARE

## 2023-04-04 ENCOUNTER — NON-APPOINTMENT (OUTPATIENT)
Age: 73
End: 2023-04-04

## 2023-04-04 ENCOUNTER — APPOINTMENT (OUTPATIENT)
Dept: ORTHOPEDIC SURGERY | Facility: CLINIC | Age: 73
End: 2023-04-04

## 2023-04-04 VITALS — BODY MASS INDEX: 30.31 KG/M2 | HEIGHT: 68 IN | WEIGHT: 200 LBS

## 2023-04-04 PROCEDURE — 99214 OFFICE O/P EST MOD 30 MIN: CPT | Mod: 25

## 2023-04-04 PROCEDURE — 20550 NJX 1 TENDON SHEATH/LIGAMENT: CPT | Mod: FA

## 2023-04-07 NOTE — HISTORY OF PRESENT ILLNESS
[FreeTextEntry1] : Symptoms and relevant review of symptoms: Patient is 73 year-old RHD  underwent right carpal tunnel release 3/18/2022.\par Prior left carpal tunnel release 5/14/2021.\par Patient reported full restoration of sensation in the right hand.\par Patient reported no change on the left with decreased sensation in the thumb index long finger. \par At most recent visit with Dr. Montiel patient noted to have cervical spine stenosis and was referred for physical therapy.\par Patient most recently seen by myself 2/14/2023.\par Patient was seen for Worker's Compensation scheduled loss of use determination.\par At that time I concluded that the patient had:\par Total degree of disability for both right and left hands: 25%\par \par Right hand loss of use: 12.5%\par Left hand loss of use 12.5%\par \par TODAY:\par Patient states that he is retired from work as an . \par Patient describes triggering of the left thumb which was very mild in August.\par It is became worse over the past 3 weeks.\par Thumb has been clicking.  Patient perceives "throbbing "pain.\par Currently the thumb is locked in extension.\par Patient requests treatment for the locked left trigger thumb.\par Patient not aware of any other trigger fingers bilaterally.\par Patient states that the previous reported status of numbness and tingling in the hands remains unchanged.\par Patient reports no new hand complaints.\par Patient is no longer working as an .

## 2023-04-07 NOTE — PHYSICAL EXAM
[de-identified] : Neck:\par Limited range of motion with local pain.\par No radiation beyond the upper back and neck region.\par \par Right shoulder: Much improved range of motion with mild pain at most\par Left shoulder\par Relatively good range of motion with minimal pain.\par \par Right hand\par Carpal tunnel incision is fully healed.\par Less induration than prior visit\par Minimal tenderness\par Right thumb pinch power 5 pounds\par Full flexion and extension of fingers.\par Subjective sensation intact all fingers right hand.\par Patient reports no more shooting pains\par With active range of motion patient has some radiation of discomfort proximally and forearm.\par Right long finger A1 pulley minimally tender.\par No evidence of active or provocable triggering in this digit.\par There is no other A1 pulley tenderness or triggering in any other finger, right hand.\par No pertinent MP, PIP, or DIP joint contributory findings, except some Heberden's nodes; none are clinically painful.\par \par Left hand\par Left thumb: Patient unwilling to flex thumb because of pain and guarding\par A1 pulley markedly tender.\par ***Following local anesthetic preinjection and cortisone injection with additional local patient able to fully flex thumb which triggered in flexion and extension.***\par There is no other A1 pulley tenderness or triggering in any other finger, left hand.\par No pertinent MP, PIP, or DIP joint contributory findings, except some Heberden's nodes; none are clinically painful.\par \par Neurologic:\par Right hand:\par  normal light touch sensation all fingers.\par Motor strength intact.\par Phalen's test with median nerve compression: Negative\par Surgical area is soft and nontender\par Radial nerve motor and sensory and ulnar nerve motor and sensory are intact.\par \par Neurologic:\par Left hand\par Residual decreased sensation in the thumb and index finger primarily; long finger subjective light touch sensation nearly normal.\par Subjectively the sensation is better than it was prior to surgery.\par Phalen's test with median nerve compression: Negative\par Thenar muscle tone and bulk are good\par Normal sensation elsewhere including dorsally\par Pinch power left hand: 3.5 pounds\par \par Skin: No cyanosis, clubbing, edema or rashes.\par Vascular: Radial pulses intact.\par Lymphatic: No streaking or epitrochlear adenopathy.\par The patient is awake, alert, and oriented. Affect appropriate. Cooperative.

## 2023-04-07 NOTE — PHYSICAL EXAM
[de-identified] : Neck:\par Limited range of motion with local pain.\par No radiation beyond the upper back and neck region.\par \par Right shoulder: Much improved range of motion with mild pain at most\par Left shoulder\par Relatively good range of motion with minimal pain.\par \par Right hand\par Carpal tunnel incision is fully healed.\par Less induration than prior visit\par Minimal tenderness\par Right thumb pinch power 5 pounds\par Full flexion and extension of fingers.\par Subjective sensation intact all fingers right hand.\par Patient reports no more shooting pains\par With active range of motion patient has some radiation of discomfort proximally and forearm.\par Right long finger A1 pulley minimally tender.\par No evidence of active or provocable triggering in this digit.\par There is no other A1 pulley tenderness or triggering in any other finger, right hand.\par No pertinent MP, PIP, or DIP joint contributory findings, except some Heberden's nodes; none are clinically painful.\par \par Left hand\par Left thumb: Patient unwilling to flex thumb because of pain and guarding\par A1 pulley markedly tender.\par ***Following local anesthetic preinjection and cortisone injection with additional local patient able to fully flex thumb which triggered in flexion and extension.***\par There is no other A1 pulley tenderness or triggering in any other finger, left hand.\par No pertinent MP, PIP, or DIP joint contributory findings, except some Heberden's nodes; none are clinically painful.\par \par Neurologic:\par Right hand:\par  normal light touch sensation all fingers.\par Motor strength intact.\par Phalen's test with median nerve compression: Negative\par Surgical area is soft and nontender\par Radial nerve motor and sensory and ulnar nerve motor and sensory are intact.\par \par Neurologic:\par Left hand\par Residual decreased sensation in the thumb and index finger primarily; long finger subjective light touch sensation nearly normal.\par Subjectively the sensation is better than it was prior to surgery.\par Phalen's test with median nerve compression: Negative\par Thenar muscle tone and bulk are good\par Normal sensation elsewhere including dorsally\par Pinch power left hand: 3.5 pounds\par \par Skin: No cyanosis, clubbing, edema or rashes.\par Vascular: Radial pulses intact.\par Lymphatic: No streaking or epitrochlear adenopathy.\par The patient is awake, alert, and oriented. Affect appropriate. Cooperative.

## 2023-04-07 NOTE — PHYSICAL EXAM
[de-identified] : Neck:\par Limited range of motion with local pain.\par No radiation beyond the upper back and neck region.\par \par Right shoulder: Much improved range of motion with mild pain at most\par Left shoulder\par Relatively good range of motion with minimal pain.\par \par Right hand\par Carpal tunnel incision is fully healed.\par Less induration than prior visit\par Minimal tenderness\par Right thumb pinch power 5 pounds\par Full flexion and extension of fingers.\par Subjective sensation intact all fingers right hand.\par Patient reports no more shooting pains\par With active range of motion patient has some radiation of discomfort proximally and forearm.\par Right long finger A1 pulley minimally tender.\par No evidence of active or provocable triggering in this digit.\par There is no other A1 pulley tenderness or triggering in any other finger, right hand.\par No pertinent MP, PIP, or DIP joint contributory findings, except some Heberden's nodes; none are clinically painful.\par \par Left hand\par Left thumb: Patient unwilling to flex thumb because of pain and guarding\par A1 pulley markedly tender.\par ***Following local anesthetic preinjection and cortisone injection with additional local patient able to fully flex thumb which triggered in flexion and extension.***\par There is no other A1 pulley tenderness or triggering in any other finger, left hand.\par No pertinent MP, PIP, or DIP joint contributory findings, except some Heberden's nodes; none are clinically painful.\par \par Neurologic:\par Right hand:\par  normal light touch sensation all fingers.\par Motor strength intact.\par Phalen's test with median nerve compression: Negative\par Surgical area is soft and nontender\par Radial nerve motor and sensory and ulnar nerve motor and sensory are intact.\par \par Neurologic:\par Left hand\par Residual decreased sensation in the thumb and index finger primarily; long finger subjective light touch sensation nearly normal.\par Subjectively the sensation is better than it was prior to surgery.\par Phalen's test with median nerve compression: Negative\par Thenar muscle tone and bulk are good\par Normal sensation elsewhere including dorsally\par Pinch power left hand: 3.5 pounds\par \par Skin: No cyanosis, clubbing, edema or rashes.\par Vascular: Radial pulses intact.\par Lymphatic: No streaking or epitrochlear adenopathy.\par The patient is awake, alert, and oriented. Affect appropriate. Cooperative.

## 2023-04-07 NOTE — DISCUSSION/SUMMARY
[FreeTextEntry1] : Patient is no longer working as an .  \par Patient presents with a problem not related to prior Worker's Compensation conditions of carpal tunnel syndrome.  \par Today patient presents with trigger finger left thumb locked in extension.  Because of pain and stiffness and associated disability cortisone injection is indicated.  I have reviewed risks, benefits pros and cons with patient.  Patient expressed understanding, requested, and was treated with 2 phase Kenalog 20 mg injection into the left thumb flexor tendon sheath complication.\par Patient has no other trigger fingers.\par Patient reports slightly residual altered sensation in the left hand following left carpal tunnel release and normal sensation on the right.\par There are no other active hand problems requiring treatment.\par The statistical chances of resolution versus recurrence, and the statistics regarding the possibility of additional injections  were discussed with the patient.\par \par The following post-injection instructions were given to the patient: The patient should be cautious in activities for two weeks and then increase to full activities.  Thereafter, the patient should return if symptoms continue, or if they keon and recur subsequently. If symptoms do not recur, the patient need not return and can be seen on an as needed basis. The patient has expressed understanding and acceptance of analysis, treatment, and recommendations.  All questions answered.

## 2023-06-20 NOTE — H&P PST ADULT - MALLAMPATI CLASS
This RN assessed patient for evaluation of acute gouty arthritis. The patient reports new attack, no history of gout. Patient reports her pain is ongoing, her joint stiffness is stable and her joint swelling is worse. Limitation on activities include difficulty with walking and difficulty with getting dressed. This RN educated patient on avoiding high purine foods: Eating too many foods containing purines may increase the levels of uric acid in your body and increase your risk for a gout attack. It may be best to limit these high-purine foods:     Alcohol (beer, hard liquor and red wine). You may be told to give up alcohol completely.    Certain fish (anchovies, sardines, fish roes, herring, tuna, mussels, codfish, scallops, trout, and gretchen)    Certain meats (red meat, processed meat, gamble, turkey, wild game, and goose)    Sauces and gravies made with meat    Organ meats (such as liver, kidneys, sweetbreads, and tripe)    Legumes (such as dried beans and peas)    Mushrooms, spinach, asparagus, and cauliflower  Yeast and yeast extract supplements.    Dr. Bateman assessed patient's foot to determine gout vs. Hand foot syndrome. Images available in media tab of Right foot. Provider will send prescription for gout to local pharmacy.    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 Informed patient someone from scheduling will call her to arrange appointment.    This RN spoke infusion  to arrange Injectafer infusion, Anamika will call patient to arrange appointment.    At this time oncology appointment postponed until after patient sees Dr. Garza in Cardiology.     Patient verbalized understanding. Patient will see PCP on Thursday 06/22/23.    Liseth Yoon, RN, BSN  [facility]  Oncology/Hematology Care Coordinator RN  [facility]  [phone]  6/20/2023, 4:27 PM     Class II - visualization of the soft palate, fauces, and uvula Class III - visualization of the soft palate and the base of the uvula

## 2023-08-02 ENCOUNTER — APPOINTMENT (OUTPATIENT)
Dept: ORTHOPEDIC SURGERY | Facility: CLINIC | Age: 73
End: 2023-08-02
Payer: OTHER MISCELLANEOUS

## 2023-08-02 VITALS — BODY MASS INDEX: 30.31 KG/M2 | HEIGHT: 68 IN | WEIGHT: 200 LBS

## 2023-08-02 PROCEDURE — 99214 OFFICE O/P EST MOD 30 MIN: CPT

## 2023-08-02 NOTE — DISCUSSION/SUMMARY
[de-identified] : We discussed further treatment options.  We discussed nonsurgical and surgical options.  This point he wished to continue with nonsurgical treatment.  He would like to try additional physical therapy.  Prescription was given.  Follow-up after or sooner with any changes or worsening of his symptoms.  Follow-up afterwards or sooner with any changes or worsening of his symptoms.

## 2023-08-02 NOTE — HISTORY OF PRESENT ILLNESS
[de-identified] : Mr. TALYA JON  is a 72 year old male who presents to the office for a follow-up visit.  He finished PT the first week of June.  He continues to have neck tightness.

## 2023-08-02 NOTE — PHYSICAL EXAM
[Ataxic] : not ataxic [de-identified] : Examination of the cervical spine reveals no midline or paraspinal tenderness to palpation. No cervical lymphadenopathy. Decreased range of motion with respect to flexion, extension, rotation, and lateral bending.  Negative Spurlings. Negative Lhermitte's. Full range of motion bilateral shoulders without evidence of impingement. No instability of bilateral upper extremities.  Cranial nerves II through XII grossly intact. Intact sensation bilateral upper extremities.  Grossly preserved strength bilaterally.  He may have some slight weakness in his left triceps.  1+ biceps triceps and brachioradialis reflexes. Negative Hobbs's. 2+ radial pulse. . No skin lesions on the right and left upper extremities. [de-identified] : AP lateral cervical x-rays does not reveal any obvious fracture or dislocation.  Multilevel spondylosis\par  \par  Updated cervical spine MRI does reveal multilevel spondylosis and foraminal stenosis.  Right greater than left C3-4 foraminal stenosis and left-sided disc herniation at C6-7\par  \par  EMG does reveal evidence of cervical radiculopathy and carpal tunnel syndrome

## 2023-09-04 NOTE — ASU PATIENT PROFILE, ADULT - VISION (WITH CORRECTIVE LENSES IF THE PATIENT USUALLY WEARS THEM):
57w9v Normal vision: sees adequately in most situations; can see medication labels, newsprint Partially impaired: cannot see medication labels or newsprint, but can see obstacles in path, and the surrounding layout; can count fingers at arm's length

## 2023-11-27 ENCOUNTER — APPOINTMENT (OUTPATIENT)
Dept: ORTHOPEDIC SURGERY | Facility: CLINIC | Age: 73
End: 2023-11-27
Payer: OTHER MISCELLANEOUS

## 2023-11-27 VITALS — HEIGHT: 68 IN | BODY MASS INDEX: 30.92 KG/M2 | WEIGHT: 204 LBS

## 2023-11-27 PROCEDURE — 99214 OFFICE O/P EST MOD 30 MIN: CPT

## 2024-03-13 ENCOUNTER — NON-APPOINTMENT (OUTPATIENT)
Age: 74
End: 2024-03-13

## 2024-03-13 ENCOUNTER — APPOINTMENT (OUTPATIENT)
Dept: ORTHOPEDIC SURGERY | Facility: CLINIC | Age: 74
End: 2024-03-13
Payer: OTHER MISCELLANEOUS

## 2024-03-13 VITALS — WEIGHT: 200 LBS | HEIGHT: 68 IN | BODY MASS INDEX: 30.31 KG/M2

## 2024-03-13 DIAGNOSIS — H10.33 UNSPECIFIED ACUTE CONJUNCTIVITIS, BILATERAL: ICD-10-CM

## 2024-03-13 DIAGNOSIS — M50.20 OTHER CERVICAL DISC DISPLACEMENT, UNSPECIFIED CERVICAL REGION: ICD-10-CM

## 2024-03-13 DIAGNOSIS — J30.89 OTHER ALLERGIC RHINITIS: ICD-10-CM

## 2024-03-13 DIAGNOSIS — J30.81 ALLERGIC RHINITIS DUE TO ANIMAL (CAT) (DOG) HAIR AND DANDER: ICD-10-CM

## 2024-03-13 PROCEDURE — 99214 OFFICE O/P EST MOD 30 MIN: CPT

## 2024-03-13 NOTE — HISTORY OF PRESENT ILLNESS
[de-identified] : Mr. TALYA JON  is a 72 year old male who presents to the office for a follow-up visit.  He finished PT which was a big help.  He continues to have neck tightness.

## 2024-03-13 NOTE — PHYSICAL EXAM
[Ataxic] : not ataxic [de-identified] : Examination of the cervical spine reveals no midline or paraspinal tenderness to palpation. No cervical lymphadenopathy. Decreased range of motion with respect to flexion, extension, rotation, and lateral bending.  Negative Spurlings. Negative Lhermitte's. Full range of motion bilateral shoulders without evidence of impingement. No instability of bilateral upper extremities.  Cranial nerves II through XII grossly intact. Intact sensation bilateral upper extremities.  Grossly preserved strength bilaterally.  He may have some slight weakness in his left triceps.  1+ biceps triceps and brachioradialis reflexes. Negative Hobbs's. 2+ radial pulse. . No skin lesions on the right and left upper extremities. [de-identified] : AP lateral cervical x-rays does not reveal any obvious fracture or dislocation.  Multilevel spondylosis\par  \par  Updated cervical spine MRI does reveal multilevel spondylosis and foraminal stenosis.  Right greater than left C3-4 foraminal stenosis and left-sided disc herniation at C6-7\par  \par  EMG does reveal evidence of cervical radiculopathy and carpal tunnel syndrome

## 2024-03-13 NOTE — DISCUSSION/SUMMARY
[de-identified] : We discussed further treatment options both nonsurgical and surgical.  This point he wished to continue with conservative measures.  He would like to continue with therapy.  Updated prescription was given.  Follow-up after or sooner with any changes or worsening of his symptoms.

## 2024-03-22 ENCOUNTER — APPOINTMENT (OUTPATIENT)
Dept: ORTHOPEDIC SURGERY | Facility: CLINIC | Age: 74
End: 2024-03-22
Payer: OTHER MISCELLANEOUS

## 2024-03-22 PROCEDURE — 99214 OFFICE O/P EST MOD 30 MIN: CPT

## 2024-03-22 NOTE — DISCUSSION/SUMMARY
[de-identified] : We discussed further treatment options.  He appears to have exacerbation of his symptoms his neck.  Candidly admits he has not been doing his therapy exercises.

## 2024-03-22 NOTE — PHYSICAL EXAM
[Ataxic] : not ataxic [de-identified] : Examination of the cervical spine reveals no midline or paraspinal tenderness to palpation. No cervical lymphadenopathy. Decreased range of motion with respect to flexion, extension, rotation, and lateral bending.  Negative Spurlings. Negative Lhermitte's. Full range of motion bilateral shoulders without evidence of impingement. No instability of bilateral upper extremities.  Cranial nerves II through XII grossly intact. Intact sensation bilateral upper extremities.  Grossly preserved strength bilaterally.  He may have some slight weakness in his left triceps.  1+ biceps triceps and brachioradialis reflexes. Negative Hobbs's. 2+ radial pulse. . No skin lesions on the right and left upper extremities. [de-identified] : AP lateral cervical x-rays does not reveal any obvious fracture or dislocation.  Multilevel spondylosis\par  \par  Updated cervical spine MRI does reveal multilevel spondylosis and foraminal stenosis.  Right greater than left C3-4 foraminal stenosis and left-sided disc herniation at C6-7\par  \par  EMG does reveal evidence of cervical radiculopathy and carpal tunnel syndrome

## 2024-03-22 NOTE — HISTORY OF PRESENT ILLNESS
[de-identified] : Mr. TALYA JON  is a 72 year old male who presents to the office for a follow-up visit.  He finished PT which was a big help.  He continues to have neck tightness.

## 2024-03-25 ENCOUNTER — NON-APPOINTMENT (OUTPATIENT)
Age: 74
End: 2024-03-25

## 2024-03-25 DIAGNOSIS — Z87.09 PERSONAL HISTORY OF OTHER DISEASES OF THE RESPIRATORY SYSTEM: ICD-10-CM

## 2024-03-26 ENCOUNTER — APPOINTMENT (OUTPATIENT)
Dept: PEDIATRIC ALLERGY IMMUNOLOGY | Facility: CLINIC | Age: 74
End: 2024-03-26
Payer: MEDICARE

## 2024-03-26 PROCEDURE — 95165 ANTIGEN THERAPY SERVICES: CPT

## 2024-03-26 PROCEDURE — 95117 IMMUNOTHERAPY INJECTIONS: CPT

## 2024-04-24 ENCOUNTER — APPOINTMENT (OUTPATIENT)
Dept: PEDIATRIC ALLERGY IMMUNOLOGY | Facility: CLINIC | Age: 74
End: 2024-04-24
Payer: MEDICARE

## 2024-04-24 PROCEDURE — 95117 IMMUNOTHERAPY INJECTIONS: CPT

## 2024-04-24 PROCEDURE — 95165 ANTIGEN THERAPY SERVICES: CPT

## 2024-04-24 RX ORDER — FLUTICASONE PROPIONATE 50 UG/1
50 SPRAY, METERED NASAL DAILY
Qty: 3 | Refills: 0 | Status: ACTIVE | COMMUNITY
Start: 2024-04-24 | End: 1900-01-01

## 2024-04-24 RX ORDER — LEVOCETIRIZINE DIHYDROCHLORIDE 5 MG/1
5 TABLET ORAL
Qty: 90 | Refills: 0 | Status: ACTIVE | COMMUNITY
Start: 2024-04-24 | End: 1900-01-01

## 2024-05-22 ENCOUNTER — APPOINTMENT (OUTPATIENT)
Dept: PEDIATRIC ALLERGY IMMUNOLOGY | Facility: CLINIC | Age: 74
End: 2024-05-22
Payer: MEDICARE

## 2024-05-22 PROCEDURE — 95165 ANTIGEN THERAPY SERVICES: CPT

## 2024-05-22 PROCEDURE — 95117 IMMUNOTHERAPY INJECTIONS: CPT

## 2024-06-17 ENCOUNTER — NON-APPOINTMENT (OUTPATIENT)
Age: 74
End: 2024-06-17

## 2024-06-18 ENCOUNTER — APPOINTMENT (OUTPATIENT)
Dept: PEDIATRIC ALLERGY IMMUNOLOGY | Facility: CLINIC | Age: 74
End: 2024-06-18
Payer: MEDICARE

## 2024-06-18 DIAGNOSIS — J30.1 ALLERGIC RHINITIS DUE TO POLLEN: ICD-10-CM

## 2024-06-18 PROCEDURE — 95165 ANTIGEN THERAPY SERVICES: CPT

## 2024-06-18 PROCEDURE — 95117 IMMUNOTHERAPY INJECTIONS: CPT

## 2024-06-19 ENCOUNTER — APPOINTMENT (OUTPATIENT)
Dept: ORTHOPEDIC SURGERY | Facility: CLINIC | Age: 74
End: 2024-06-19

## 2024-06-19 ENCOUNTER — APPOINTMENT (OUTPATIENT)
Dept: ORTHOPEDIC SURGERY | Facility: CLINIC | Age: 74
End: 2024-06-19
Payer: OTHER MISCELLANEOUS

## 2024-06-19 VITALS — WEIGHT: 205 LBS | HEIGHT: 68 IN | BODY MASS INDEX: 31.07 KG/M2

## 2024-06-19 DIAGNOSIS — M48.02 SPINAL STENOSIS, CERVICAL REGION: ICD-10-CM

## 2024-06-19 DIAGNOSIS — M54.12 RADICULOPATHY, CERVICAL REGION: ICD-10-CM

## 2024-06-19 PROCEDURE — 99214 OFFICE O/P EST MOD 30 MIN: CPT

## 2024-06-19 NOTE — DISCUSSION/SUMMARY
[de-identified] : Overall, symptoms are improving with home exercises.  He would like to try course of physical therapy.  Prescription was given.  Follow-up afterwards.

## 2024-06-19 NOTE — HISTORY OF PRESENT ILLNESS
[de-identified] : Mr. TALYA JON  is a 72 year old male who presents to the office for a follow-up visit.  He finished PT 2-3 weeks ago.  He continues to have neck tightness. He is interested in pursuing another course of PT.

## 2024-06-19 NOTE — PHYSICAL EXAM
[Ataxic] : not ataxic [de-identified] : Examination of the cervical spine reveals no midline or paraspinal tenderness to palpation. No cervical lymphadenopathy. Decreased range of motion with respect to flexion, extension, rotation, and lateral bending.  Negative Spurlings. Negative Lhermitte's. Full range of motion bilateral shoulders without evidence of impingement. No instability of bilateral upper extremities.  Cranial nerves II through XII grossly intact. Intact sensation bilateral upper extremities.  Grossly preserved strength bilaterally.  He may have some slight weakness in his left triceps.  1+ biceps triceps and brachioradialis reflexes. Negative Hobbs's. 2+ radial pulse. . No skin lesions on the right and left upper extremities. [de-identified] : AP lateral cervical x-rays does not reveal any obvious fracture or dislocation.  Multilevel spondylosis\par  \par  Updated cervical spine MRI does reveal multilevel spondylosis and foraminal stenosis.  Right greater than left C3-4 foraminal stenosis and left-sided disc herniation at C6-7\par  \par  EMG does reveal evidence of cervical radiculopathy and carpal tunnel syndrome

## 2024-06-27 ENCOUNTER — APPOINTMENT (OUTPATIENT)
Dept: ORTHOPEDIC SURGERY | Facility: CLINIC | Age: 74
End: 2024-06-27

## 2024-06-27 VITALS
DIASTOLIC BLOOD PRESSURE: 80 MMHG | HEIGHT: 68 IN | WEIGHT: 205 LBS | BODY MASS INDEX: 31.07 KG/M2 | SYSTOLIC BLOOD PRESSURE: 131 MMHG | HEART RATE: 70 BPM

## 2024-06-27 DIAGNOSIS — G56.02 CARPAL TUNNEL SYNDROME, LEFT UPPER LIMB: ICD-10-CM

## 2024-06-27 DIAGNOSIS — G56.01 CARPAL TUNNEL SYNDROME, RIGHT UPPER LIMB: ICD-10-CM

## 2024-06-27 DIAGNOSIS — M65.312 TRIGGER THUMB, LEFT THUMB: ICD-10-CM

## 2024-06-27 PROCEDURE — 99214 OFFICE O/P EST MOD 30 MIN: CPT | Mod: 25

## 2024-06-27 PROCEDURE — 99204 OFFICE O/P NEW MOD 45 MIN: CPT | Mod: 25

## 2024-06-27 PROCEDURE — 20550 NJX 1 TENDON SHEATH/LIGAMENT: CPT | Mod: LT

## 2024-06-30 PROBLEM — G56.01 CARPAL TUNNEL SYNDROME OF RIGHT WRIST: Status: ACTIVE | Noted: 2020-03-17

## 2024-07-03 ENCOUNTER — NON-APPOINTMENT (OUTPATIENT)
Age: 74
End: 2024-07-03

## 2024-07-16 ENCOUNTER — APPOINTMENT (OUTPATIENT)
Dept: PEDIATRIC ALLERGY IMMUNOLOGY | Facility: CLINIC | Age: 74
End: 2024-07-16

## 2024-07-25 ENCOUNTER — APPOINTMENT (OUTPATIENT)
Dept: PEDIATRIC ALLERGY IMMUNOLOGY | Facility: CLINIC | Age: 74
End: 2024-07-25
Payer: COMMERCIAL

## 2024-07-25 DIAGNOSIS — J30.1 ALLERGIC RHINITIS DUE TO POLLEN: ICD-10-CM

## 2024-07-25 DIAGNOSIS — J30.81 ALLERGIC RHINITIS DUE TO ANIMAL (CAT) (DOG) HAIR AND DANDER: ICD-10-CM

## 2024-07-25 PROCEDURE — 95117 IMMUNOTHERAPY INJECTIONS: CPT

## 2024-07-25 PROCEDURE — 95165 ANTIGEN THERAPY SERVICES: CPT

## 2024-07-31 ENCOUNTER — APPOINTMENT (OUTPATIENT)
Dept: ORTHOPEDIC SURGERY | Facility: CLINIC | Age: 74
End: 2024-07-31
Payer: OTHER MISCELLANEOUS

## 2024-07-31 VITALS — BODY MASS INDEX: 31.07 KG/M2 | WEIGHT: 205 LBS | HEIGHT: 68 IN

## 2024-07-31 DIAGNOSIS — M48.02 SPINAL STENOSIS, CERVICAL REGION: ICD-10-CM

## 2024-07-31 PROCEDURE — 99214 OFFICE O/P EST MOD 30 MIN: CPT

## 2024-07-31 NOTE — HISTORY OF PRESENT ILLNESS
[de-identified] : Mr. TALYA JON  is a 72 year old male who presents to the office for a follow-up visit.  He wants to do more PT but states it is denied.  He is stating he needs a letter of medical necessity.

## 2024-07-31 NOTE — DISCUSSION/SUMMARY
[de-identified] : We discussed further treatment options.  Physical therapy has been a great help to him and I do feel it is medically necessary and the variance is needed to continue with this.  New prescription was given.  He will follow-up afterwards.

## 2024-07-31 NOTE — PHYSICAL EXAM
[Ataxic] : not ataxic [de-identified] : Examination of the cervical spine reveals no midline or paraspinal tenderness to palpation. No cervical lymphadenopathy. Decreased range of motion with respect to flexion, extension, rotation, and lateral bending.  Negative Spurlings. Negative Lhermitte's. Full range of motion bilateral shoulders without evidence of impingement. No instability of bilateral upper extremities.  Cranial nerves II through XII grossly intact. Intact sensation bilateral upper extremities.  Grossly preserved strength bilaterally.  He may have some slight weakness in his left triceps.  1+ biceps triceps and brachioradialis reflexes. Negative Hobbs's. 2+ radial pulse. . No skin lesions on the right and left upper extremities. [de-identified] : AP lateral cervical x-rays does not reveal any obvious fracture or dislocation.  Multilevel spondylosis\par  \par  Updated cervical spine MRI does reveal multilevel spondylosis and foraminal stenosis.  Right greater than left C3-4 foraminal stenosis and left-sided disc herniation at C6-7\par  \par  EMG does reveal evidence of cervical radiculopathy and carpal tunnel syndrome

## 2024-07-31 NOTE — DISCUSSION/SUMMARY
[de-identified] : We discussed further treatment options.  Physical therapy has been a great help to him and I do feel it is medically necessary and the variance is needed to continue with this.  New prescription was given.  He will follow-up afterwards.

## 2024-07-31 NOTE — PHYSICAL EXAM
[Ataxic] : not ataxic [de-identified] : Examination of the cervical spine reveals no midline or paraspinal tenderness to palpation. No cervical lymphadenopathy. Decreased range of motion with respect to flexion, extension, rotation, and lateral bending.  Negative Spurlings. Negative Lhermitte's. Full range of motion bilateral shoulders without evidence of impingement. No instability of bilateral upper extremities.  Cranial nerves II through XII grossly intact. Intact sensation bilateral upper extremities.  Grossly preserved strength bilaterally.  He may have some slight weakness in his left triceps.  1+ biceps triceps and brachioradialis reflexes. Negative Hobbs's. 2+ radial pulse. . No skin lesions on the right and left upper extremities. [de-identified] : AP lateral cervical x-rays does not reveal any obvious fracture or dislocation.  Multilevel spondylosis\par  \par  Updated cervical spine MRI does reveal multilevel spondylosis and foraminal stenosis.  Right greater than left C3-4 foraminal stenosis and left-sided disc herniation at C6-7\par  \par  EMG does reveal evidence of cervical radiculopathy and carpal tunnel syndrome

## 2024-07-31 NOTE — HISTORY OF PRESENT ILLNESS
[de-identified] : Mr. TALYA JON  is a 72 year old male who presents to the office for a follow-up visit.  He wants to do more PT but states it is denied.  He is stating he needs a letter of medical necessity.

## 2024-08-26 ENCOUNTER — APPOINTMENT (OUTPATIENT)
Dept: PEDIATRIC ALLERGY IMMUNOLOGY | Facility: CLINIC | Age: 74
End: 2024-08-26
Payer: COMMERCIAL

## 2024-08-26 DIAGNOSIS — J30.89 OTHER ALLERGIC RHINITIS: ICD-10-CM

## 2024-08-26 PROCEDURE — 95165 ANTIGEN THERAPY SERVICES: CPT

## 2024-08-26 PROCEDURE — 95117 IMMUNOTHERAPY INJECTIONS: CPT

## 2024-09-24 ENCOUNTER — APPOINTMENT (OUTPATIENT)
Dept: PEDIATRIC ALLERGY IMMUNOLOGY | Facility: CLINIC | Age: 74
End: 2024-09-24
Payer: MEDICARE

## 2024-09-24 DIAGNOSIS — J30.1 ALLERGIC RHINITIS DUE TO POLLEN: ICD-10-CM

## 2024-09-24 DIAGNOSIS — J30.89 OTHER ALLERGIC RHINITIS: ICD-10-CM

## 2024-09-24 PROCEDURE — 95117 IMMUNOTHERAPY INJECTIONS: CPT

## 2024-09-24 PROCEDURE — 95165 ANTIGEN THERAPY SERVICES: CPT

## 2024-10-19 ENCOUNTER — NON-APPOINTMENT (OUTPATIENT)
Age: 74
End: 2024-10-19

## 2024-10-29 ENCOUNTER — APPOINTMENT (OUTPATIENT)
Dept: PEDIATRIC ALLERGY IMMUNOLOGY | Facility: CLINIC | Age: 74
End: 2024-10-29
Payer: MEDICARE

## 2024-10-29 DIAGNOSIS — J30.89 OTHER ALLERGIC RHINITIS: ICD-10-CM

## 2024-10-29 PROCEDURE — 95117 IMMUNOTHERAPY INJECTIONS: CPT

## 2024-10-29 PROCEDURE — 95165 ANTIGEN THERAPY SERVICES: CPT

## 2024-11-20 ENCOUNTER — APPOINTMENT (OUTPATIENT)
Dept: ORTHOPEDIC SURGERY | Facility: CLINIC | Age: 74
End: 2024-11-20
Payer: OTHER MISCELLANEOUS

## 2024-11-20 VITALS — WEIGHT: 205 LBS | BODY MASS INDEX: 31.07 KG/M2 | HEIGHT: 68 IN

## 2024-11-20 DIAGNOSIS — M48.02 SPINAL STENOSIS, CERVICAL REGION: ICD-10-CM

## 2024-11-20 PROCEDURE — 99214 OFFICE O/P EST MOD 30 MIN: CPT

## 2024-11-26 ENCOUNTER — APPOINTMENT (OUTPATIENT)
Dept: PEDIATRIC ALLERGY IMMUNOLOGY | Facility: CLINIC | Age: 74
End: 2024-11-26

## 2024-12-06 ENCOUNTER — APPOINTMENT (OUTPATIENT)
Dept: PEDIATRIC ALLERGY IMMUNOLOGY | Facility: CLINIC | Age: 74
End: 2024-12-06
Payer: MEDICARE

## 2024-12-06 DIAGNOSIS — J30.89 OTHER ALLERGIC RHINITIS: ICD-10-CM

## 2024-12-06 PROCEDURE — 95117 IMMUNOTHERAPY INJECTIONS: CPT

## 2024-12-06 PROCEDURE — 95165 ANTIGEN THERAPY SERVICES: CPT

## 2025-01-03 ENCOUNTER — APPOINTMENT (OUTPATIENT)
Dept: PEDIATRIC ALLERGY IMMUNOLOGY | Facility: CLINIC | Age: 75
End: 2025-01-03

## 2025-01-08 ENCOUNTER — APPOINTMENT (OUTPATIENT)
Dept: PEDIATRIC ALLERGY IMMUNOLOGY | Facility: CLINIC | Age: 75
End: 2025-01-08
Payer: MEDICARE

## 2025-01-08 DIAGNOSIS — J30.89 OTHER ALLERGIC RHINITIS: ICD-10-CM

## 2025-01-08 PROCEDURE — 95117 IMMUNOTHERAPY INJECTIONS: CPT

## 2025-01-08 PROCEDURE — 95165 ANTIGEN THERAPY SERVICES: CPT

## 2025-01-29 ENCOUNTER — APPOINTMENT (OUTPATIENT)
Dept: PAIN MANAGEMENT | Facility: CLINIC | Age: 75
End: 2025-01-29
Payer: OTHER MISCELLANEOUS

## 2025-01-29 VITALS — HEIGHT: 68 IN | WEIGHT: 213 LBS | BODY MASS INDEX: 32.28 KG/M2

## 2025-01-29 DIAGNOSIS — M54.12 RADICULOPATHY, CERVICAL REGION: ICD-10-CM

## 2025-01-29 DIAGNOSIS — M47.812 SPONDYLOSIS W/OUT MYELOPATHY OR RADICULOPATHY, CERVICAL REGION: ICD-10-CM

## 2025-01-29 DIAGNOSIS — M48.02 SPINAL STENOSIS, CERVICAL REGION: ICD-10-CM

## 2025-01-29 PROCEDURE — 99204 OFFICE O/P NEW MOD 45 MIN: CPT

## 2025-02-04 ENCOUNTER — APPOINTMENT (OUTPATIENT)
Dept: PEDIATRIC ALLERGY IMMUNOLOGY | Facility: CLINIC | Age: 75
End: 2025-02-04
Payer: MEDICARE

## 2025-02-04 DIAGNOSIS — J30.89 OTHER ALLERGIC RHINITIS: ICD-10-CM

## 2025-02-04 PROCEDURE — 95165 ANTIGEN THERAPY SERVICES: CPT

## 2025-02-04 PROCEDURE — 95117 IMMUNOTHERAPY INJECTIONS: CPT

## 2025-03-06 ENCOUNTER — APPOINTMENT (OUTPATIENT)
Dept: PEDIATRIC ALLERGY IMMUNOLOGY | Facility: CLINIC | Age: 75
End: 2025-03-06
Payer: MEDICARE

## 2025-03-06 DIAGNOSIS — J30.89 OTHER ALLERGIC RHINITIS: ICD-10-CM

## 2025-03-06 PROCEDURE — 95117 IMMUNOTHERAPY INJECTIONS: CPT

## 2025-03-06 PROCEDURE — 95165 ANTIGEN THERAPY SERVICES: CPT

## 2025-04-03 ENCOUNTER — APPOINTMENT (OUTPATIENT)
Dept: PEDIATRIC ALLERGY IMMUNOLOGY | Facility: CLINIC | Age: 75
End: 2025-04-03
Payer: MEDICARE

## 2025-04-03 DIAGNOSIS — J30.89 OTHER ALLERGIC RHINITIS: ICD-10-CM

## 2025-04-03 PROCEDURE — 95165 ANTIGEN THERAPY SERVICES: CPT

## 2025-04-03 PROCEDURE — 95117 IMMUNOTHERAPY INJECTIONS: CPT

## 2025-05-01 ENCOUNTER — APPOINTMENT (OUTPATIENT)
Dept: PEDIATRIC ALLERGY IMMUNOLOGY | Facility: CLINIC | Age: 75
End: 2025-05-01
Payer: MEDICARE

## 2025-05-01 DIAGNOSIS — J30.89 OTHER ALLERGIC RHINITIS: ICD-10-CM

## 2025-05-01 PROCEDURE — 95117 IMMUNOTHERAPY INJECTIONS: CPT

## 2025-05-01 PROCEDURE — 95165 ANTIGEN THERAPY SERVICES: CPT

## 2025-05-21 ENCOUNTER — APPOINTMENT (OUTPATIENT)
Dept: PAIN MANAGEMENT | Facility: CLINIC | Age: 75
End: 2025-05-21
Payer: OTHER MISCELLANEOUS

## 2025-05-21 VITALS — WEIGHT: 216 LBS | HEIGHT: 68 IN | BODY MASS INDEX: 32.74 KG/M2

## 2025-05-21 DIAGNOSIS — M54.12 RADICULOPATHY, CERVICAL REGION: ICD-10-CM

## 2025-05-21 PROCEDURE — 99214 OFFICE O/P EST MOD 30 MIN: CPT | Mod: ACP

## 2025-05-29 ENCOUNTER — APPOINTMENT (OUTPATIENT)
Dept: PEDIATRIC ALLERGY IMMUNOLOGY | Facility: CLINIC | Age: 75
End: 2025-05-29
Payer: MEDICARE

## 2025-05-29 DIAGNOSIS — J30.89 OTHER ALLERGIC RHINITIS: ICD-10-CM

## 2025-05-29 PROCEDURE — 95165 ANTIGEN THERAPY SERVICES: CPT

## 2025-05-29 PROCEDURE — 95117 IMMUNOTHERAPY INJECTIONS: CPT

## 2025-06-26 ENCOUNTER — APPOINTMENT (OUTPATIENT)
Dept: PEDIATRIC ALLERGY IMMUNOLOGY | Facility: CLINIC | Age: 75
End: 2025-06-26
Payer: COMMERCIAL

## 2025-06-26 PROCEDURE — 95117 IMMUNOTHERAPY INJECTIONS: CPT

## 2025-06-26 PROCEDURE — 95165 ANTIGEN THERAPY SERVICES: CPT

## 2025-07-15 ENCOUNTER — NON-APPOINTMENT (OUTPATIENT)
Age: 75
End: 2025-07-15

## 2025-07-16 ENCOUNTER — APPOINTMENT (OUTPATIENT)
Dept: ORTHOPEDIC SURGERY | Facility: CLINIC | Age: 75
End: 2025-07-16

## 2025-07-16 VITALS — BODY MASS INDEX: 30.46 KG/M2 | HEIGHT: 68 IN | WEIGHT: 201 LBS

## 2025-07-16 PROCEDURE — 99213 OFFICE O/P EST LOW 20 MIN: CPT

## 2025-07-28 ENCOUNTER — APPOINTMENT (OUTPATIENT)
Dept: PEDIATRIC ALLERGY IMMUNOLOGY | Facility: CLINIC | Age: 75
End: 2025-07-28
Payer: COMMERCIAL

## 2025-07-28 DIAGNOSIS — J30.89 OTHER ALLERGIC RHINITIS: ICD-10-CM

## 2025-07-28 PROCEDURE — 95117 IMMUNOTHERAPY INJECTIONS: CPT

## 2025-07-28 PROCEDURE — 95165 ANTIGEN THERAPY SERVICES: CPT

## 2025-08-26 ENCOUNTER — APPOINTMENT (OUTPATIENT)
Dept: PEDIATRIC ALLERGY IMMUNOLOGY | Facility: CLINIC | Age: 75
End: 2025-08-26
Payer: MEDICARE

## 2025-08-26 DIAGNOSIS — J30.89 OTHER ALLERGIC RHINITIS: ICD-10-CM

## 2025-08-26 PROCEDURE — 95117 IMMUNOTHERAPY INJECTIONS: CPT

## 2025-08-26 PROCEDURE — 95165 ANTIGEN THERAPY SERVICES: CPT

## 2025-08-26 PROCEDURE — 99213 OFFICE O/P EST LOW 20 MIN: CPT | Mod: 25

## 2025-08-26 RX ORDER — FLUTICASONE PROPIONATE 50 UG/1
50 SPRAY NASAL
Qty: 3 | Refills: 1 | Status: ACTIVE | COMMUNITY
Start: 2025-08-26 | End: 1900-01-01

## (undated) DEVICE — SUT POLYSORB 4-0 18" P-13 UNDYED

## (undated) DEVICE — MEDICATION LABELS W MARKER

## (undated) DEVICE — TOURNIQUET CUFF 18" DUAL PORT SINGLE BLADDER W PLC  (BLACK)

## (undated) DEVICE — PREP CHLOROHEXIDINE 4% 118CC KIT

## (undated) DEVICE — PACK HAND

## (undated) DEVICE — BLADE SCALPEL SAFETYLOCK #15

## (undated) DEVICE — SOL IRR POUR NS 0.9% 500ML

## (undated) DEVICE — GLV 8 PROTEXIS (WHITE)

## (undated) DEVICE — TOURNIQUET CUFF 12" DUAL PORT W PLC

## (undated) DEVICE — SPECIMEN CONTAINER 100ML

## (undated) DEVICE — DRAPE TOWEL BLUE 17" X 24"

## (undated) DEVICE — GLV 8.5 PROTEXIS (WHITE)

## (undated) DEVICE — SLV COMPRESSION KNEE MED

## (undated) DEVICE — POSITIONER FOAM EGG CRATE ULNAR 2PCS (PINK)

## (undated) DEVICE — SUT MONOSOF 5-0 18" P-13

## (undated) DEVICE — WARMING BLANKET LOWER ADULT